# Patient Record
Sex: MALE | Race: WHITE | NOT HISPANIC OR LATINO | URBAN - METROPOLITAN AREA
[De-identification: names, ages, dates, MRNs, and addresses within clinical notes are randomized per-mention and may not be internally consistent; named-entity substitution may affect disease eponyms.]

---

## 2019-03-21 ENCOUNTER — EMERGENCY (EMERGENCY)
Facility: HOSPITAL | Age: 74
LOS: 1 days | Discharge: HOME | End: 2019-03-21
Attending: EMERGENCY MEDICINE
Payer: MEDICARE

## 2019-03-21 VITALS
DIASTOLIC BLOOD PRESSURE: 73 MMHG | SYSTOLIC BLOOD PRESSURE: 122 MMHG | OXYGEN SATURATION: 98 % | HEART RATE: 56 BPM | RESPIRATION RATE: 18 BRPM | TEMPERATURE: 98 F

## 2019-03-21 VITALS
OXYGEN SATURATION: 96 % | RESPIRATION RATE: 17 BRPM | TEMPERATURE: 96 F | WEIGHT: 179.9 LBS | DIASTOLIC BLOOD PRESSURE: 58 MMHG | SYSTOLIC BLOOD PRESSURE: 92 MMHG | HEIGHT: 69 IN | HEART RATE: 40 BPM

## 2019-03-21 LAB
ALBUMIN SERPL ELPH-MCNC: 3.5 G/DL — SIGNIFICANT CHANGE UP (ref 3.3–5.2)
ALP SERPL-CCNC: 63 U/L — SIGNIFICANT CHANGE UP (ref 40–120)
ALT FLD-CCNC: 12 U/L — SIGNIFICANT CHANGE UP
ANION GAP SERPL CALC-SCNC: 8 MMOL/L — SIGNIFICANT CHANGE UP (ref 5–17)
APTT BLD: 29.2 SEC — SIGNIFICANT CHANGE UP (ref 27.5–36.3)
AST SERPL-CCNC: 16 U/L — SIGNIFICANT CHANGE UP
BILIRUB SERPL-MCNC: 0.4 MG/DL — SIGNIFICANT CHANGE UP (ref 0.4–2)
BUN SERPL-MCNC: 19 MG/DL — SIGNIFICANT CHANGE UP (ref 8–20)
CALCIUM SERPL-MCNC: 9.7 MG/DL — SIGNIFICANT CHANGE UP (ref 8.6–10.2)
CHLORIDE SERPL-SCNC: 105 MMOL/L — SIGNIFICANT CHANGE UP (ref 98–107)
CO2 SERPL-SCNC: 28 MMOL/L — SIGNIFICANT CHANGE UP (ref 22–29)
CREAT SERPL-MCNC: 0.74 MG/DL — SIGNIFICANT CHANGE UP (ref 0.5–1.3)
GLUCOSE SERPL-MCNC: 97 MG/DL — SIGNIFICANT CHANGE UP (ref 70–115)
HCT VFR BLD CALC: 39.1 % — LOW (ref 42–52)
HGB BLD-MCNC: 13.3 G/DL — LOW (ref 14–18)
INR BLD: 1.12 RATIO — SIGNIFICANT CHANGE UP (ref 0.88–1.16)
MAGNESIUM SERPL-MCNC: 1.9 MG/DL — SIGNIFICANT CHANGE UP (ref 1.6–2.6)
MCHC RBC-ENTMCNC: 31.9 PG — HIGH (ref 27–31)
MCHC RBC-ENTMCNC: 34 G/DL — SIGNIFICANT CHANGE UP (ref 32–36)
MCV RBC AUTO: 93.8 FL — SIGNIFICANT CHANGE UP (ref 80–94)
NT-PROBNP SERPL-SCNC: 56 PG/ML — SIGNIFICANT CHANGE UP (ref 0–300)
PLATELET # BLD AUTO: 293 K/UL — SIGNIFICANT CHANGE UP (ref 150–400)
POTASSIUM SERPL-MCNC: 4 MMOL/L — SIGNIFICANT CHANGE UP (ref 3.5–5.3)
POTASSIUM SERPL-SCNC: 4 MMOL/L — SIGNIFICANT CHANGE UP (ref 3.5–5.3)
PROT SERPL-MCNC: 6 G/DL — LOW (ref 6.6–8.7)
PROTHROM AB SERPL-ACNC: 12.9 SEC — SIGNIFICANT CHANGE UP (ref 10–12.9)
RBC # BLD: 4.17 M/UL — LOW (ref 4.6–6.2)
RBC # FLD: 13 % — SIGNIFICANT CHANGE UP (ref 11–15.6)
SODIUM SERPL-SCNC: 141 MMOL/L — SIGNIFICANT CHANGE UP (ref 135–145)
TROPONIN T SERPL-MCNC: <0.01 NG/ML — SIGNIFICANT CHANGE UP (ref 0–0.06)
WBC # BLD: 6.8 K/UL — SIGNIFICANT CHANGE UP (ref 4.8–10.8)
WBC # FLD AUTO: 6.8 K/UL — SIGNIFICANT CHANGE UP (ref 4.8–10.8)

## 2019-03-21 PROCEDURE — 99284 EMERGENCY DEPT VISIT MOD MDM: CPT | Mod: 25

## 2019-03-21 PROCEDURE — 99284 EMERGENCY DEPT VISIT MOD MDM: CPT

## 2019-03-21 PROCEDURE — 70551 MRI BRAIN STEM W/O DYE: CPT

## 2019-03-21 PROCEDURE — 70551 MRI BRAIN STEM W/O DYE: CPT | Mod: 26

## 2019-03-21 PROCEDURE — 83735 ASSAY OF MAGNESIUM: CPT

## 2019-03-21 PROCEDURE — 80053 COMPREHEN METABOLIC PANEL: CPT

## 2019-03-21 PROCEDURE — 71045 X-RAY EXAM CHEST 1 VIEW: CPT | Mod: 26

## 2019-03-21 PROCEDURE — 96375 TX/PRO/DX INJ NEW DRUG ADDON: CPT

## 2019-03-21 PROCEDURE — 99285 EMERGENCY DEPT VISIT HI MDM: CPT

## 2019-03-21 PROCEDURE — 70450 CT HEAD/BRAIN W/O DYE: CPT

## 2019-03-21 PROCEDURE — 72125 CT NECK SPINE W/O DYE: CPT | Mod: 26

## 2019-03-21 PROCEDURE — 36415 COLL VENOUS BLD VENIPUNCTURE: CPT

## 2019-03-21 PROCEDURE — 85730 THROMBOPLASTIN TIME PARTIAL: CPT

## 2019-03-21 PROCEDURE — 83880 ASSAY OF NATRIURETIC PEPTIDE: CPT

## 2019-03-21 PROCEDURE — 70544 MR ANGIOGRAPHY HEAD W/O DYE: CPT

## 2019-03-21 PROCEDURE — 85610 PROTHROMBIN TIME: CPT

## 2019-03-21 PROCEDURE — 84484 ASSAY OF TROPONIN QUANT: CPT

## 2019-03-21 PROCEDURE — 71045 X-RAY EXAM CHEST 1 VIEW: CPT

## 2019-03-21 PROCEDURE — 96361 HYDRATE IV INFUSION ADD-ON: CPT

## 2019-03-21 PROCEDURE — 85027 COMPLETE CBC AUTOMATED: CPT

## 2019-03-21 PROCEDURE — 70450 CT HEAD/BRAIN W/O DYE: CPT | Mod: 26

## 2019-03-21 PROCEDURE — 72125 CT NECK SPINE W/O DYE: CPT

## 2019-03-21 PROCEDURE — 96374 THER/PROPH/DIAG INJ IV PUSH: CPT

## 2019-03-21 PROCEDURE — 70544 MR ANGIOGRAPHY HEAD W/O DYE: CPT | Mod: 26,59

## 2019-03-21 RX ORDER — SODIUM CHLORIDE 9 MG/ML
1000 INJECTION INTRAMUSCULAR; INTRAVENOUS; SUBCUTANEOUS ONCE
Qty: 0 | Refills: 0 | Status: COMPLETED | OUTPATIENT
Start: 2019-03-21 | End: 2019-03-21

## 2019-03-21 RX ORDER — DIPHENHYDRAMINE HCL 50 MG
50 CAPSULE ORAL ONCE
Qty: 0 | Refills: 0 | Status: COMPLETED | OUTPATIENT
Start: 2019-03-21 | End: 2019-03-21

## 2019-03-21 RX ADMIN — SODIUM CHLORIDE 1000 MILLILITER(S): 9 INJECTION INTRAMUSCULAR; INTRAVENOUS; SUBCUTANEOUS at 18:06

## 2019-03-21 RX ADMIN — Medication 1 MILLIGRAM(S): at 14:18

## 2019-03-21 RX ADMIN — Medication 50 MILLIGRAM(S): at 14:18

## 2019-03-21 RX ADMIN — Medication 1 MILLIGRAM(S): at 14:46

## 2019-03-21 RX ADMIN — SODIUM CHLORIDE 2000 MILLILITER(S): 9 INJECTION INTRAMUSCULAR; INTRAVENOUS; SUBCUTANEOUS at 15:14

## 2019-03-21 NOTE — ED PROVIDER NOTE - PHYSICAL EXAMINATION
left sided abd pain x3 days, vomiting last night and diarrhea since this morning, chills, diaphoresis and generalized weakness. Denies urianry symtpoms, sick contact at home. Mid abd discomfort secondary to throwing up. Last episode vomiting at 4:00am today. Hx of gallstones with similar symptoms

## 2019-03-21 NOTE — ED ADULT NURSE NOTE - EENT WDL
Eyes with no visual disturbances.  Ears clean and dry and no hearing difficulties. Nose with pink mucosa and no drainage.  Abrasions to nose present. Mouth mucous membranes moist and pink.  No tenderness or swelling to throat or neck.

## 2019-03-21 NOTE — ED ADULT TRIAGE NOTE - CHIEF COMPLAINT QUOTE
Patient arrived via EMS, awake, alert, baseline mental status as per EMS.  Patient from [facility].  breathing unlabored.  unsteady gait baseline.  As per patient, " leg fell asleep" and fell.  Dry blood noted to nose.  Upper lip swelling and bruising noted.  Patient has no complaints at this time. Patient arrived via EMS, awake, alert, baseline mental status as per EMS.  Patient from [facility].  breathing unlabored.  unsteady gait baseline.  As per patient, " leg fell asleep" and fell.  Dry blood noted to nose.  Upper lip swelling and bruising noted.  Patient has no complaints at this time.  patient denies CP, dizziness or lightheadedness

## 2019-03-21 NOTE — CONSULT NOTE ADULT - ASSESSMENT
A/P:  73yo male with PMH ?HTN, ?HLD, sent to ED from [facility] s/p fall.  Patient states "his legs gave out" and "medications make me feel drowsy" causing a fall, denies LOC, has abrasion to bridge of nose and swelling to nose noted.  Patient is poor historian, able to obtain some medical data from [facility], message left for PMD Missy ([phone]).  EKG no acte ischemic changes. Troponin neg x1. Patient asymptomatic.    1- Sinus bradycardia  - Tele, until continues above 50bpm  - Repeat EKG  - Ambulate to ensure HR remains above 50  - DC Metoprolol Tartrate  - Call out to Dr. Louis  - Outpatient cardio f/u within 1 week, Holter suggested    2- HLD  - c/w Simvastatin

## 2019-03-21 NOTE — ED ADULT NURSE NOTE - CHIEF COMPLAINT QUOTE
Patient arrived via EMS, awake, alert, baseline mental status as per EMS.  Patient from [facility].  breathing unlabored.  unsteady gait baseline.  As per patient, " leg fell asleep" and fell.  Dry blood noted to nose.  Upper lip swelling and bruising noted.  Patient has no complaints at this time.  patient denies CP, dizziness or lightheadedness

## 2019-03-21 NOTE — ED ADULT NURSE NOTE - OBJECTIVE STATEMENT
Assumed pt care at this time. Pt is alert, oriented to self only. Repetitively stating, "Get me out of here." Pt SB on monitor down to 40. Normotensive at this time. Pt with cough present. Dried blood to nose. Pt offers no complaints, except to leave. Call bell within reach, safety measures in place. Will continue to closely monitor. Assumed pt care at this time. Pt is alert, oriented to self only. Repetitively stating, "Get me out of here." Pt SB on monitor down to 40. Normotensive at this time. Pt with cough present. Dried blood to nose. Pt offers no complaints, except to leave. Call bell within reach, safety measures in place. Will continue to closely monitor. Pt placed on bearhugger for hypothermia. Assumed pt care at this time. Pt is alert, oriented to self only. Repetitively stating, "Get me out of here." Pt with slightly slurred speech, no facial droop. Pt SB on monitor down to 40. Normotensive at this time. Pt with cough present. Dried blood to nose. Pt offers no complaints, except to leave. Call bell within reach, safety measures in place. Will continue to closely monitor. Pt placed on bearhugger for hypothermia.

## 2019-03-21 NOTE — ED PROVIDER NOTE - CLINICAL SUMMARY MEDICAL DECISION MAKING FREE TEXT BOX
r/o traumatic injury, keep on monitor for braden, symptomatic braden and possible syncope, as somewhat limited historian will get cardiac consult

## 2019-03-21 NOTE — ED PROVIDER NOTE - OBJECTIVE STATEMENT
75 y/o M pt with hx of lives in group home presents to ED c/o stating his legs gave out and he fell, pt limited historian, there is visible trauma to the head with nasal abrasion and dry blood. HR noted to be 38 bpm. Denies any neck, chest or LE pain. Pt states he does not have a cardiologist.

## 2019-03-21 NOTE — ED PROVIDER NOTE - PROGRESS NOTE DETAILS
called  at group home - romy nurse was able to take message regarding CT/MRI findings and need for outpt cards f/u for bradycardia and neuro sx fx - reports will be sent with patient will arrange for transport back

## 2019-03-21 NOTE — ED ADULT NURSE REASSESSMENT NOTE - NS ED NURSE REASSESS COMMENT FT1
MRI tech called, pt is "wiggling around too much to get any good imaging" . Dr. Medina aware to be medicated with an additional 1mg IV ativan.

## 2019-03-21 NOTE — ED ADULT NURSE NOTE - NSIMPLEMENTINTERV_GEN_ALL_ED
Implemented All Fall with Harm Risk Interventions:  [place] to call system. Call bell, personal items and telephone within reach. Instruct patient to call for assistance. Room bathroom lighting operational. Non-slip footwear when patient is off stretcher. Physically safe environment: no spills, clutter or unnecessary equipment. Stretcher in lowest position, wheels locked, appropriate side rails in place. Provide visual cue, wrist band, yellow gown, etc. Monitor gait and stability. Monitor for mental status changes and reorient to person, place, and time. Review medications for side effects contributing to fall risk. Reinforce activity limits and safety measures with patient and family. Provide visual clues: red socks.

## 2019-03-21 NOTE — CONSULT NOTE ADULT - SUBJECTIVE AND OBJECTIVE BOX
CARDIOLOGY CONSULTATION NOTE   Consult requested by:  Dr. Medina    Reason for Consultation: Bradycardia    History obtained by: Patient, [facility] ([phone]) and medical record     obtained: No    Chief complaint:    Patient is a 74y old  Male who presents with a chief complaint of fall.    HPI:  73yo male with PMH ?HTN, ?HLD, sent to ED from [facility] s/p fall.  Patient states "his legs gave out" and "medications make me feel drowsy" causing a fall, denies LOC, has abrasion to bridge of nose and swelling to nose noted.  Patient is poor historian, able to obtain some medical data from [facility], message left for PMD Masod ([phone]). Denies chest pain/pressure, palpitations, irregular and/or rapid heart beat, SOB, TAVERAS, syncope/near syncope, dizziness, orthopnea, PND, cough, edema, n/v/d, hematuria, or hematochezia.      REVIEW OF SYMPTOMS:   Constitutional: Denied: fever, chills, weight loss or gain  Eyes: Denied: reddened ayes, eye discharge, eye pain  ENMT: Denied: nose swelling, pain and abrasion to bridge of nose s/p fall  Cardiovascular: Denied: chest pain,  Chest pressure, palpitation, arrhythmia, irregular or fast heart beats, edema  Respiratory: Denied: cough, phlegm production, wheezes, dyspnea, orthopnea, PND,   GI: Denied: Abdominal pain, nausea, vomiting, diarrhea, constipation, melena, rectal bleed  : Denied: hematuria, frequency  Musculoskeletal: Denied: Muscle aches, weakness, pain  Hematology/lymp: Denied: Bleeding disorder, anemia, blood clotting  Neuro: Denied: Headache, light headedness, dizziness, numbness, aphasia, dysarthria, seizure,  syncope, near syncope  Psych: Denied: depression, anxiety  Integumentary/skin: abrasion to bridge of nose s/p fall      ALL OTHER REVIEW OF SYSTEMS ARE NEGATIVE.    MEDICATIONS  (STANDING):  sodium chloride 0.9% Bolus 1000 milliLiter(s) IV Bolus once    Home Medications:  ASA 81mg QD  Famotidine 20mg QD  Folic Acid 1mg QD  Metoprolol Tartrate 25mg BID  Singulair 10mg qHS  Zyprexa 20mg qHS  Simvastatin 40mg qHS  Tamsulosin 0.4mg QD  Terazosin 2mg QD  Albuterol HFA PRN      PAST MEDICAL & SURGICAL HISTORY:  ?HTN  ?HLD      FAMILY HISTORY:  unknown    SOCIAL HISTORY: Lives at [facility]    CIGARETTES:  No    ALCOHOL: No    DRUGS: No    Vital Signs Last 24 Hrs  T(C): 36.6 (21 Mar 2019 10:19), Max: 36.6 (21 Mar 2019 10:19)  T(F): 97.9 (21 Mar 2019 10:19), Max: 97.9 (21 Mar 2019 10:19)  HR: 41 (21 Mar 2019 10:19) (38 - 41)  BP: 118/56 (21 Mar 2019 09:43) (92/58 - 118/56)  RR: 18 (21 Mar 2019 10:19) (16 - 18)  SpO2: 96% (21 Mar 2019 10:19) (96% - 96%)    PHYSICAL EXAM:      Constitutional: Disheveled. No fever, chills, NAD, Comfortable    Eyes: Not reddened, no discharge    ENMT: No discharge, No pain    Neck: No JVD, No Bruit    Back: No CVA tenderness    Respiratory: Clear to auscultation bilaterally    Cardiovascular: Sinus Bradycardia. RRR, Normal S1-2, No S3-, No friction rub murmur    Gastrointestinal: Soft, NT/ND. BS+, No organomegaly    Extremities: No edema    Vascular: Distal pulses intact    Neurological: Alert, awake, oriented to self, able to move extremities, speech is clear    Skin: Abrasion to bridge of nose    Musculoskeletal: Non tender, no weaknss    Psychiatric: anxious, restless        INTERPRETATION OF TELEMETRY: Sinus bradycardia 40-50s, no arrhythmic events noted     ECG: Sinus bradycardia, 38bpm, 1st degree AV block, NSST abnormalities      LABS:                        13.3   6.8   )-----------( 293      ( 21 Mar 2019 09:04 )             39.1     03-21    141  |  105  |  19.0  ----------------------------<  97  4.0   |  28.0  |  0.74    Ca    9.7      21 Mar 2019 09:04  Mg     1.9     03-21    TPro  6.0<L>  /  Alb  3.5  /  TBili  0.4  /  DBili  x   /  AST  16  /  ALT  12  /  AlkPhos  63  03-21    CARDIAC MARKERS ( 21 Mar 2019 09:04 )  x     / <0.01 ng/mL / x     / x     / x          PT/INR - ( 21 Mar 2019 09:04 )   PT: 12.9 sec;   INR: 1.12 ratio         PTT - ( 21 Mar 2019 09:04 )  PTT:29.2 sec    I&O's Summary      RADIOLOGY & ADDITIONAL STUDIES:   X-ray:    < from: Xray Chest 1 View-PORTABLE IMMEDIATE (03.21.19 @ 09:22) >  EXAM:  XR CHEST PORTABLE IMMED 1V                          PROCEDURE DATE:  03/21/2019          INTERPRETATION:  Clinical information: Chest pain.    Technique: Frontal view of the chest.    Comparison: None available.    Findings: The lungs are clear. The heart size is enlarged. Multilevel   degenerative changes are noted within the imaged potions of the spine.    IMPRESSION: Cardiomegaly.    < end of copied text >    < from: CT Head No Cont (03.21.19 @ 08:49) >  IMPRESSION:  Brain:    No CT evidence of an acute territorial infarction, hemorrhage or acute   hydrocephalus.     Dense extra-axial lesion along the planum sphenoidale eccentric to the   left, likely meningioma, other etiologies including aneurysm cannot be   excluded, follow-up contrast enhanced MRI/MRA suggested.    Prominence of ventricles consistent with involutional changes.    Chronic small vessel ischemic changes.    Cervical spine:    Chronic multilevel degenerative cervical spondylosis without acute   fracture; multilevel canal and foraminal narrowing as described above.      If symptoms persist an additional evaluation is needed, follow-up MRI is   suggested.    < end of copied text >

## 2019-03-25 PROBLEM — Z00.00 ENCOUNTER FOR PREVENTIVE HEALTH EXAMINATION: Status: ACTIVE | Noted: 2019-03-25

## 2019-03-26 ENCOUNTER — APPOINTMENT (OUTPATIENT)
Dept: NEUROSURGERY | Facility: CLINIC | Age: 74
End: 2019-03-26
Payer: MEDICARE

## 2019-03-26 VITALS
WEIGHT: 180 LBS | TEMPERATURE: 98.2 F | BODY MASS INDEX: 28.25 KG/M2 | HEART RATE: 50 BPM | SYSTOLIC BLOOD PRESSURE: 116 MMHG | DIASTOLIC BLOOD PRESSURE: 69 MMHG | HEIGHT: 67 IN

## 2019-03-26 DIAGNOSIS — Z78.9 OTHER SPECIFIED HEALTH STATUS: ICD-10-CM

## 2019-03-26 DIAGNOSIS — Z86.79 PERSONAL HISTORY OF OTHER DISEASES OF THE CIRCULATORY SYSTEM: ICD-10-CM

## 2019-03-26 DIAGNOSIS — Z86.39 PERSONAL HISTORY OF OTHER ENDOCRINE, NUTRITIONAL AND METABOLIC DISEASE: ICD-10-CM

## 2019-03-26 PROCEDURE — 99203 OFFICE O/P NEW LOW 30 MIN: CPT

## 2019-04-01 NOTE — HISTORY OF PRESENT ILLNESS
[FreeTextEntry1] : s/p fall incidental finding planum meningioma.  [de-identified] : Mr. Leon is a 74 year old male who presents for initial neurosurgical evaluation of an incidental finding of planum meningioma.s/p fall.   Patient resides in a group home setting.  He recalls sustaining a fall after his "leg fell asleep".  Patient struck left side of head and transported to [facility] ER  3/21/2019.   At today's visit, he admits to intermittent left side of head pain secondary to head impact.  Denies any nausea or vomiting.  Tolerating po.  Denies any visual disturbances or seizures.  Denies any numbness/tingling or weakness of extremities.  No new falls.   Denies any personal or family history of cancer.  Denies any fever or chills.

## 2019-04-01 NOTE — REASON FOR VISIT
[New Patient Visit] : a new patient visit [Referred By: _________] : Patient was referred by [facility]IC [FreeTextEntry1] : planum meningioma

## 2019-04-01 NOTE — PHYSICAL EXAM
[General Appearance - Alert] : alert [General Appearance - In No Acute Distress] : in no acute distress [General Appearance - Well Nourished] : well nourished [General Appearance - Well Developed] : well developed [General Appearance - Well-Appearing] : healthy appearing [] : normal voice and communication [Oriented to Time] : oriented to time [Person] : oriented to person [Fluency] : fluency intact [Cranial Nerves Optic (II)] : visual acuity intact bilaterally,  pupils equal round and reactive to light [Cranial Nerves Oculomotor (III)] : extraocular motion intact [Cranial Nerves Trigeminal (V)] : facial sensation intact symmetrically [Cranial Nerves Facial (VII)] : face symmetrical [Cranial Nerves Glossopharyngeal (IX)] : tongue and palate midline [Cranial Nerves Accessory (XI - Cranial And Spinal)] : head turning and shoulder shrug symmetric [Cranial Nerves Hypoglossal (XII)] : there was no tongue deviation with protrusion [Motor Tone] : muscle tone was normal in all four extremities [Motor Strength] : muscle strength was normal in all four extremities [Sensation Tactile Decrease] : light touch was intact [Sensation Pain / Temperature Decrease] : pain and temperature was intact [Abnormal Walk] : normal gait [Balance] : balance was intact [No Visual Abnormalities] : no visible abnormailities [Normal] : normal [Oriented to Place] : disoriented to place [Over the Past 2 Weeks, Have You Felt Down, Depressed, or Hopeless?] : 1.) Over the past 2 weeks, have you felt down, depressed, or hopeless? No [Over the Past 2 Weeks, Have You Felt Little Interest or Pleasure Doing Things?] : 2.) Over the past 2 weeks, have you felt little interest or pleasure doing things? No

## 2019-04-01 NOTE — ASSESSMENT
[FreeTextEntry1] : Mr. Leon presents with above history and imaging.  There is an incidental finding of a planum meningioma after patient sustained a fall at his group home setting.  He will obtain an MRI brain w/wo contrast to further assess his meningioma.  He should follow up after imaging.  He knows to call the office if there are any new or worsening symptoms.

## 2019-05-17 PROBLEM — Z78.9 OTHER SPECIFIED HEALTH STATUS: Chronic | Status: ACTIVE | Noted: 2019-03-21

## 2019-05-28 ENCOUNTER — APPOINTMENT (OUTPATIENT)
Dept: NEUROSURGERY | Facility: CLINIC | Age: 74
End: 2019-05-28
Payer: MEDICARE

## 2019-05-28 VITALS
SYSTOLIC BLOOD PRESSURE: 128 MMHG | WEIGHT: 180 LBS | DIASTOLIC BLOOD PRESSURE: 70 MMHG | HEIGHT: 67 IN | TEMPERATURE: 98.8 F | HEART RATE: 60 BPM | BODY MASS INDEX: 28.25 KG/M2

## 2019-05-28 DIAGNOSIS — D32.9 BENIGN NEOPLASM OF MENINGES, UNSPECIFIED: ICD-10-CM

## 2019-05-28 PROCEDURE — 99214 OFFICE O/P EST MOD 30 MIN: CPT

## 2019-05-28 RX ORDER — SIMVASTATIN 40 MG/1
40 TABLET, FILM COATED ORAL
Refills: 0 | Status: ACTIVE | COMMUNITY

## 2019-05-28 RX ORDER — OLANZAPINE 20 MG/1
20 TABLET, FILM COATED ORAL
Refills: 0 | Status: ACTIVE | COMMUNITY

## 2019-05-28 RX ORDER — ASPIRIN ENTERIC COATED TABLETS 81 MG 81 MG/1
81 TABLET, DELAYED RELEASE ORAL
Refills: 0 | Status: ACTIVE | COMMUNITY

## 2019-05-28 RX ORDER — TAMSULOSIN HYDROCHLORIDE 0.4 MG/1
0.4 CAPSULE ORAL
Refills: 0 | Status: ACTIVE | COMMUNITY

## 2019-05-28 RX ORDER — FAMOTIDINE 20 MG/1
20 TABLET, FILM COATED ORAL
Refills: 0 | Status: ACTIVE | COMMUNITY

## 2019-05-28 RX ORDER — FOLIC ACID 1 MG/1
1 TABLET ORAL
Refills: 0 | Status: ACTIVE | COMMUNITY

## 2019-05-28 RX ORDER — MONTELUKAST 10 MG/1
10 TABLET, FILM COATED ORAL
Refills: 0 | Status: ACTIVE | COMMUNITY

## 2019-05-28 RX ORDER — METOPROLOL SUCCINATE 25 MG/1
25 TABLET, EXTENDED RELEASE ORAL
Refills: 0 | Status: ACTIVE | COMMUNITY

## 2019-05-28 RX ORDER — TERAZOSIN 2 MG/1
2 CAPSULE ORAL
Refills: 0 | Status: ACTIVE | COMMUNITY

## 2019-05-28 RX ORDER — ALBUTEROL SULFATE 1.25 MG/3ML
SOLUTION RESPIRATORY (INHALATION)
Refills: 0 | Status: ACTIVE | COMMUNITY

## 2019-05-28 NOTE — PHYSICAL EXAM
[General Appearance - Alert] : alert [General Appearance - In No Acute Distress] : in no acute distress [General Appearance - Well Nourished] : well nourished [General Appearance - Well Developed] : well developed [General Appearance - Well-Appearing] : healthy appearing [] : normal voice and communication [Oriented To Time, Place, And Person] : oriented to person, place, and time [Oriented to Person] : oriented to person [Oriented to Place] : oriented to place [Person] : oriented to person [Place] : oriented to place [Fluency] : fluency intact [Cranial Nerves Optic (II)] : visual acuity intact bilaterally,  pupils equal round and reactive to light [Cranial Nerves Oculomotor (III)] : extraocular motion intact [Cranial Nerves Trigeminal (V)] : facial sensation intact symmetrically [Cranial Nerves Facial (VII)] : face symmetrical [Cranial Nerves Glossopharyngeal (IX)] : tongue and palate midline [Cranial Nerves Accessory (XI - Cranial And Spinal)] : head turning and shoulder shrug symmetric [Cranial Nerves Hypoglossal (XII)] : there was no tongue deviation with protrusion [Motor Tone] : muscle tone was normal in all four extremities [Motor Strength] : muscle strength was normal in all four extremities [Sensation Tactile Decrease] : light touch was intact [Sensation Pain / Temperature Decrease] : pain and temperature was intact [Abnormal Walk] : normal gait [Balance] : balance was intact [No Visual Abnormalities] : no visible abnormailities [Normal] : normal

## 2019-05-28 NOTE — ASSESSMENT
[FreeTextEntry1] : Mr. Leon presents with above history and imaging.  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  Patient will obtain a Opthalmology exam with VF testing for baseline.  Patient will repeat his MRI brain w/wo contrast to assess for any progression of his planum meningioma. This was discussed with Priscilla his formal caregiver and Isidro HOUSTON at the [facility].  Patient and staff know to call the office if there are any new or worsening symptoms.

## 2021-05-03 ENCOUNTER — APPOINTMENT (OUTPATIENT)
Dept: SURGERY | Facility: CLINIC | Age: 76
End: 2021-05-03
Payer: MEDICARE

## 2021-05-03 VITALS
DIASTOLIC BLOOD PRESSURE: 66 MMHG | BODY MASS INDEX: 25.43 KG/M2 | WEIGHT: 162 LBS | HEART RATE: 52 BPM | OXYGEN SATURATION: 99 % | TEMPERATURE: 97.4 F | RESPIRATION RATE: 16 BRPM | HEIGHT: 67 IN | SYSTOLIC BLOOD PRESSURE: 122 MMHG

## 2021-05-03 DIAGNOSIS — K21.9 GASTRO-ESOPHAGEAL REFLUX DISEASE W/OUT ESOPHAGITIS: ICD-10-CM

## 2021-05-03 DIAGNOSIS — Z01.818 ENCOUNTER FOR OTHER PREPROCEDURAL EXAMINATION: ICD-10-CM

## 2021-05-03 DIAGNOSIS — N40.0 BENIGN PROSTATIC HYPERPLASIA WITHOUT LOWER URINARY TRACT SYMPMS: ICD-10-CM

## 2021-05-03 DIAGNOSIS — R19.09 OTHER INTRA-ABDOMINAL AND PELVIC SWELLING, MASS AND LUMP: ICD-10-CM

## 2021-05-03 DIAGNOSIS — I10 ESSENTIAL (PRIMARY) HYPERTENSION: ICD-10-CM

## 2021-05-03 DIAGNOSIS — J44.9 CHRONIC OBSTRUCTIVE PULMONARY DISEASE, UNSPECIFIED: ICD-10-CM

## 2021-05-03 DIAGNOSIS — F20.9 SCHIZOPHRENIA, UNSPECIFIED: ICD-10-CM

## 2021-05-03 DIAGNOSIS — E78.5 HYPERLIPIDEMIA, UNSPECIFIED: ICD-10-CM

## 2021-05-03 PROCEDURE — 99202 OFFICE O/P NEW SF 15 MIN: CPT

## 2021-05-03 PROCEDURE — 99212 OFFICE O/P EST SF 10 MIN: CPT

## 2021-05-03 NOTE — PHYSICAL EXAM
[Normal Thyroid] : the thyroid was normal [Normal Breath Sounds] : Normal breath sounds [Normal Heart Sounds] : normal heart sounds [Normal Rate and Rhythm] : normal rate and rhythm [No Rash or Lesion] : No rash or lesion [Alert] : alert [Oriented to Person] : oriented to person [Oriented to Place] : oriented to place [Agitated] : agitated [JVD] : no jugular venous distention  [Abdominal Masses] : No abdominal masses [Abdomen Tenderness] : ~T ~M No abdominal tenderness [Tender] : was nontender [Purpura] : no purpura  [Enlarged] : not enlarged [Petechiae] : no petechiae [Skin Ulcer] : no ulcer [Skin Induration] : no induration [Oriented to Time] : disoriented to time [de-identified] : NAD  [de-identified] : LAY [de-identified] : soft non tender nondistended  [de-identified] : + bulge to right groin [de-identified] : grossly intact  [de-identified] : clean dry intact

## 2021-05-03 NOTE — ASSESSMENT
[FreeTextEntry1] : 76 year old man with extensive psychiatric and medical history (obtained by [facility] Manager, Jessica). Possible right inguinal hernia; patient/caregiver given Rx for CT abd/pelvis as pt refusing full PE at this time.\par

## 2021-05-03 NOTE — REVIEW OF SYSTEMS
[Confused] : confusion [Negative] : Heme/Lymph [Dysuria] : no dysuria [Incontinence] : no incontinence [Hesitancy] : no urinary hesitancy [Nocturia] : no nocturia [Genital Lesion] : no genital lesions [Testicular Pain] : no testicular pain [Convulsions] : no convulsions [Dizziness] : no dizziness [Fainting] : no fainting [Limb Weakness] : no limb weakness [Difficulty Walking] : no difficulty walking [FreeTextEntry8] : + bulge to right pubic area [de-identified] : agitated

## 2021-05-03 NOTE — HISTORY OF PRESENT ILLNESS
[de-identified] : Mr. VERONICA PORTILLO is a 76 year-old man who resides in [facility]. History of schizophrenia, GERD, HTN, HLD, COPD and bulge to right groin noticed by home health aide x 5 days ago. No additional paperwork with patient or caregiver. Awaiting callback from manager at [facility]

## 2021-05-03 NOTE — PLAN
[FreeTextEntry1] : RTC after CT obtained, Adult Home (Jessica) made aware that patients HCP should be actively involved in patient's care if surgery is recommended. Plan discussed with above, verbalized understanding.

## 2022-03-27 NOTE — DATA REVIEWED
[de-identified] : Left planum sphenoidale meningioma.  The mass appears to extend into the adjacent left optic canal and partially encases the left optic nerve proximal to the chiasm.  The chiasm is clear.  1 Principal Discharge DX:	Pain, dental

## 2022-06-22 ENCOUNTER — INPATIENT (INPATIENT)
Facility: HOSPITAL | Age: 77
LOS: 1 days | Discharge: HOME | DRG: 158 | End: 2022-06-24
Attending: INTERNAL MEDICINE | Admitting: FAMILY MEDICINE
Payer: MEDICARE

## 2022-06-22 VITALS
SYSTOLIC BLOOD PRESSURE: 115 MMHG | HEIGHT: 69 IN | DIASTOLIC BLOOD PRESSURE: 68 MMHG | WEIGHT: 199.96 LBS | RESPIRATION RATE: 16 BRPM | TEMPERATURE: 98 F | HEART RATE: 98 BPM | OXYGEN SATURATION: 95 %

## 2022-06-22 DIAGNOSIS — L03.211 CELLULITIS OF FACE: ICD-10-CM

## 2022-06-22 LAB
ALBUMIN SERPL ELPH-MCNC: 3.9 G/DL — SIGNIFICANT CHANGE UP (ref 3.3–5.2)
ALP SERPL-CCNC: 63 U/L — SIGNIFICANT CHANGE UP (ref 40–120)
ALT FLD-CCNC: 10 U/L — SIGNIFICANT CHANGE UP
ANION GAP SERPL CALC-SCNC: 10 MMOL/L — SIGNIFICANT CHANGE UP (ref 5–17)
APPEARANCE UR: ABNORMAL
AST SERPL-CCNC: 16 U/L — SIGNIFICANT CHANGE UP
BACTERIA # UR AUTO: ABNORMAL
BASOPHILS # BLD AUTO: 0.03 K/UL — SIGNIFICANT CHANGE UP (ref 0–0.2)
BASOPHILS NFR BLD AUTO: 0.2 % — SIGNIFICANT CHANGE UP (ref 0–2)
BILIRUB SERPL-MCNC: 0.8 MG/DL — SIGNIFICANT CHANGE UP (ref 0.4–2)
BILIRUB UR-MCNC: NEGATIVE — SIGNIFICANT CHANGE UP
BUN SERPL-MCNC: 19.3 MG/DL — SIGNIFICANT CHANGE UP (ref 8–20)
CALCIUM SERPL-MCNC: 9.4 MG/DL — SIGNIFICANT CHANGE UP (ref 8.6–10.2)
CHLORIDE SERPL-SCNC: 100 MMOL/L — SIGNIFICANT CHANGE UP (ref 98–107)
CK SERPL-CCNC: 102 U/L — SIGNIFICANT CHANGE UP (ref 30–200)
CO2 SERPL-SCNC: 25 MMOL/L — SIGNIFICANT CHANGE UP (ref 22–29)
COLOR SPEC: YELLOW — SIGNIFICANT CHANGE UP
CREAT SERPL-MCNC: 1.04 MG/DL — SIGNIFICANT CHANGE UP (ref 0.5–1.3)
CRP SERPL-MCNC: 140 MG/L — HIGH
DIFF PNL FLD: ABNORMAL
EGFR: 75 ML/MIN/1.73M2 — SIGNIFICANT CHANGE UP
EOSINOPHIL # BLD AUTO: 0 K/UL — SIGNIFICANT CHANGE UP (ref 0–0.5)
EOSINOPHIL NFR BLD AUTO: 0 % — SIGNIFICANT CHANGE UP (ref 0–6)
EPI CELLS # UR: SIGNIFICANT CHANGE UP
ERYTHROCYTE [SEDIMENTATION RATE] IN BLOOD: 30 MM/HR — HIGH (ref 0–20)
GLUCOSE SERPL-MCNC: 107 MG/DL — HIGH (ref 70–99)
GLUCOSE UR QL: 250 MG/DL
HCT VFR BLD CALC: 41.6 % — SIGNIFICANT CHANGE UP (ref 39–50)
HGB BLD-MCNC: 14.5 G/DL — SIGNIFICANT CHANGE UP (ref 13–17)
HYALINE CASTS # UR AUTO: ABNORMAL /LPF
IMM GRANULOCYTES NFR BLD AUTO: 0.4 % — SIGNIFICANT CHANGE UP (ref 0–1.5)
KETONES UR-MCNC: NEGATIVE — SIGNIFICANT CHANGE UP
LACTATE BLDV-MCNC: 1.5 MMOL/L — SIGNIFICANT CHANGE UP (ref 0.5–2)
LEUKOCYTE ESTERASE UR-ACNC: ABNORMAL
LYMPHOCYTES # BLD AUTO: 0.81 K/UL — LOW (ref 1–3.3)
LYMPHOCYTES # BLD AUTO: 5.6 % — LOW (ref 13–44)
MCHC RBC-ENTMCNC: 32.4 PG — SIGNIFICANT CHANGE UP (ref 27–34)
MCHC RBC-ENTMCNC: 34.9 GM/DL — SIGNIFICANT CHANGE UP (ref 32–36)
MCV RBC AUTO: 92.9 FL — SIGNIFICANT CHANGE UP (ref 80–100)
MONOCYTES # BLD AUTO: 1.27 K/UL — HIGH (ref 0–0.9)
MONOCYTES NFR BLD AUTO: 8.7 % — SIGNIFICANT CHANGE UP (ref 2–14)
NEUTROPHILS # BLD AUTO: 12.36 K/UL — HIGH (ref 1.8–7.4)
NEUTROPHILS NFR BLD AUTO: 85.1 % — HIGH (ref 43–77)
NITRITE UR-MCNC: NEGATIVE — SIGNIFICANT CHANGE UP
PH UR: 6 — SIGNIFICANT CHANGE UP (ref 5–8)
PLATELET # BLD AUTO: 186 K/UL — SIGNIFICANT CHANGE UP (ref 150–400)
POTASSIUM SERPL-MCNC: 4.1 MMOL/L — SIGNIFICANT CHANGE UP (ref 3.5–5.3)
POTASSIUM SERPL-SCNC: 4.1 MMOL/L — SIGNIFICANT CHANGE UP (ref 3.5–5.3)
PROT SERPL-MCNC: 6.7 G/DL — SIGNIFICANT CHANGE UP (ref 6.6–8.7)
PROT UR-MCNC: 30 MG/DL
RAPID RVP RESULT: SIGNIFICANT CHANGE UP
RBC # BLD: 4.48 M/UL — SIGNIFICANT CHANGE UP (ref 4.2–5.8)
RBC # FLD: 12.6 % — SIGNIFICANT CHANGE UP (ref 10.3–14.5)
RBC CASTS # UR COMP ASSIST: ABNORMAL /HPF (ref 0–4)
SARS-COV-2 RNA SPEC QL NAA+PROBE: SIGNIFICANT CHANGE UP
SODIUM SERPL-SCNC: 134 MMOL/L — LOW (ref 135–145)
SP GR SPEC: 1.01 — SIGNIFICANT CHANGE UP (ref 1.01–1.02)
TROPONIN T SERPL-MCNC: <0.01 NG/ML — SIGNIFICANT CHANGE UP (ref 0–0.06)
UROBILINOGEN FLD QL: NEGATIVE MG/DL — SIGNIFICANT CHANGE UP
WBC # BLD: 14.53 K/UL — HIGH (ref 3.8–10.5)
WBC # FLD AUTO: 14.53 K/UL — HIGH (ref 3.8–10.5)
WBC UR QL: ABNORMAL /HPF (ref 0–5)

## 2022-06-22 PROCEDURE — 99223 1ST HOSP IP/OBS HIGH 75: CPT

## 2022-06-22 PROCEDURE — 70487 CT MAXILLOFACIAL W/DYE: CPT | Mod: 26,MA

## 2022-06-22 PROCEDURE — 72125 CT NECK SPINE W/O DYE: CPT | Mod: 26,MA

## 2022-06-22 PROCEDURE — 70450 CT HEAD/BRAIN W/O DYE: CPT | Mod: 26,MA

## 2022-06-22 PROCEDURE — 93010 ELECTROCARDIOGRAM REPORT: CPT

## 2022-06-22 PROCEDURE — 99285 EMERGENCY DEPT VISIT HI MDM: CPT

## 2022-06-22 PROCEDURE — 71045 X-RAY EXAM CHEST 1 VIEW: CPT | Mod: 26

## 2022-06-22 PROCEDURE — 72170 X-RAY EXAM OF PELVIS: CPT | Mod: 26

## 2022-06-22 RX ORDER — OLANZAPINE 15 MG/1
1 TABLET, FILM COATED ORAL
Qty: 0 | Refills: 0 | DISCHARGE

## 2022-06-22 RX ORDER — MONTELUKAST 4 MG/1
10 TABLET, CHEWABLE ORAL DAILY
Refills: 0 | Status: DISCONTINUED | OUTPATIENT
Start: 2022-06-22 | End: 2022-06-24

## 2022-06-22 RX ORDER — AMPICILLIN SODIUM AND SULBACTAM SODIUM 250; 125 MG/ML; MG/ML
INJECTION, POWDER, FOR SUSPENSION INTRAMUSCULAR; INTRAVENOUS
Refills: 0 | Status: DISCONTINUED | OUTPATIENT
Start: 2022-06-22 | End: 2022-06-23

## 2022-06-22 RX ORDER — FAMOTIDINE 10 MG/ML
20 INJECTION INTRAVENOUS
Refills: 0 | Status: DISCONTINUED | OUTPATIENT
Start: 2022-06-22 | End: 2022-06-24

## 2022-06-22 RX ORDER — TAMSULOSIN HYDROCHLORIDE 0.4 MG/1
0.4 CAPSULE ORAL AT BEDTIME
Refills: 0 | Status: DISCONTINUED | OUTPATIENT
Start: 2022-06-22 | End: 2022-06-24

## 2022-06-22 RX ORDER — ONDANSETRON 8 MG/1
4 TABLET, FILM COATED ORAL EVERY 8 HOURS
Refills: 0 | Status: DISCONTINUED | OUTPATIENT
Start: 2022-06-22 | End: 2022-06-24

## 2022-06-22 RX ORDER — OLANZAPINE 15 MG/1
20 TABLET, FILM COATED ORAL DAILY
Refills: 0 | Status: DISCONTINUED | OUTPATIENT
Start: 2022-06-22 | End: 2022-06-24

## 2022-06-22 RX ORDER — TAMSULOSIN HYDROCHLORIDE 0.4 MG/1
1 CAPSULE ORAL
Qty: 0 | Refills: 0 | DISCHARGE

## 2022-06-22 RX ORDER — SODIUM CHLORIDE 9 MG/ML
1000 INJECTION INTRAMUSCULAR; INTRAVENOUS; SUBCUTANEOUS
Refills: 0 | Status: DISCONTINUED | OUTPATIENT
Start: 2022-06-22 | End: 2022-06-24

## 2022-06-22 RX ORDER — FAMOTIDINE 10 MG/ML
1 INJECTION INTRAVENOUS
Qty: 0 | Refills: 0 | DISCHARGE

## 2022-06-22 RX ORDER — AMPICILLIN SODIUM AND SULBACTAM SODIUM 250; 125 MG/ML; MG/ML
1.5 INJECTION, POWDER, FOR SUSPENSION INTRAMUSCULAR; INTRAVENOUS EVERY 6 HOURS
Refills: 0 | Status: DISCONTINUED | OUTPATIENT
Start: 2022-06-22 | End: 2022-06-23

## 2022-06-22 RX ORDER — SIMVASTATIN 20 MG/1
1 TABLET, FILM COATED ORAL
Qty: 0 | Refills: 0 | DISCHARGE

## 2022-06-22 RX ORDER — TERAZOSIN HYDROCHLORIDE 10 MG/1
1 CAPSULE ORAL
Qty: 0 | Refills: 0 | DISCHARGE

## 2022-06-22 RX ORDER — LANOLIN ALCOHOL/MO/W.PET/CERES
3 CREAM (GRAM) TOPICAL AT BEDTIME
Refills: 0 | Status: DISCONTINUED | OUTPATIENT
Start: 2022-06-22 | End: 2022-06-24

## 2022-06-22 RX ORDER — SODIUM CHLORIDE 9 MG/ML
1000 INJECTION INTRAMUSCULAR; INTRAVENOUS; SUBCUTANEOUS ONCE
Refills: 0 | Status: COMPLETED | OUTPATIENT
Start: 2022-06-22 | End: 2022-06-22

## 2022-06-22 RX ORDER — ACETAMINOPHEN 500 MG
650 TABLET ORAL EVERY 6 HOURS
Refills: 0 | Status: DISCONTINUED | OUTPATIENT
Start: 2022-06-22 | End: 2022-06-24

## 2022-06-22 RX ORDER — SIMVASTATIN 20 MG/1
20 TABLET, FILM COATED ORAL AT BEDTIME
Refills: 0 | Status: DISCONTINUED | OUTPATIENT
Start: 2022-06-22 | End: 2022-06-24

## 2022-06-22 RX ORDER — MONTELUKAST 4 MG/1
1 TABLET, CHEWABLE ORAL
Qty: 0 | Refills: 0 | DISCHARGE

## 2022-06-22 RX ORDER — AMPICILLIN SODIUM AND SULBACTAM SODIUM 250; 125 MG/ML; MG/ML
1.5 INJECTION, POWDER, FOR SUSPENSION INTRAMUSCULAR; INTRAVENOUS ONCE
Refills: 0 | Status: COMPLETED | OUTPATIENT
Start: 2022-06-22 | End: 2022-06-22

## 2022-06-22 RX ORDER — ENOXAPARIN SODIUM 100 MG/ML
40 INJECTION SUBCUTANEOUS EVERY 24 HOURS
Refills: 0 | Status: DISCONTINUED | OUTPATIENT
Start: 2022-06-22 | End: 2022-06-24

## 2022-06-22 RX ADMIN — AMPICILLIN SODIUM AND SULBACTAM SODIUM 100 GRAM(S): 250; 125 INJECTION, POWDER, FOR SUSPENSION INTRAMUSCULAR; INTRAVENOUS at 18:16

## 2022-06-22 RX ADMIN — SODIUM CHLORIDE 65 MILLILITER(S): 9 INJECTION INTRAMUSCULAR; INTRAVENOUS; SUBCUTANEOUS at 18:16

## 2022-06-22 RX ADMIN — Medication 100 MILLIGRAM(S): at 12:04

## 2022-06-22 RX ADMIN — FAMOTIDINE 20 MILLIGRAM(S): 10 INJECTION INTRAVENOUS at 18:16

## 2022-06-22 RX ADMIN — TAMSULOSIN HYDROCHLORIDE 0.4 MILLIGRAM(S): 0.4 CAPSULE ORAL at 21:29

## 2022-06-22 RX ADMIN — SIMVASTATIN 20 MILLIGRAM(S): 20 TABLET, FILM COATED ORAL at 21:29

## 2022-06-22 RX ADMIN — OLANZAPINE 20 MILLIGRAM(S): 15 TABLET, FILM COATED ORAL at 21:29

## 2022-06-22 RX ADMIN — SODIUM CHLORIDE 1000 MILLILITER(S): 9 INJECTION INTRAMUSCULAR; INTRAVENOUS; SUBCUTANEOUS at 09:21

## 2022-06-22 RX ADMIN — AMPICILLIN SODIUM AND SULBACTAM SODIUM 100 GRAM(S): 250; 125 INJECTION, POWDER, FOR SUSPENSION INTRAMUSCULAR; INTRAVENOUS at 13:42

## 2022-06-22 NOTE — H&P ADULT - NSHPPHYSICALEXAM_GEN_ALL_CORE
T(C): 36.7 (06-22-22 @ 10:29), Max: 36.9 (06-22-22 @ 07:59)  HR: 89 (06-22-22 @ 10:29) (89 - 98)  BP: 156/85 (06-22-22 @ 10:29) (115/68 - 156/85)  RR: 18 (06-22-22 @ 10:29) (16 - 18)  SpO2: 97% (06-22-22 @ 10:29) (95% - 97%)    GEN - NAD  HEENT - NCAT, EOMI, JUANCHO, swollen rt lower face/rt side of neck, also enlarge c-ln b/l. non tender. no active gum swelling/lesion seen but few broken teeth- lower  RESP - CTA BL, no wheeze/rhonchi/crackles. not on supplemental O2.  CARDIO - NS1S2, RRR. No murmurs/rubs/gallops.  ABD - Soft/Non tender/Non distended. Normal BS x4 quadrants.   Ext - No PARISA.   MSK - full ROM of BL upper and lower extremities without pain or restriction. BL 5/5 strength on upper and lower extremities. rt knee-skin abrasion, non tender  Neuro - cn 2-12 grossly intact.  no visible seizure activity appreciated. no tremor. gait not observed.    Psych- AAOx3. coop

## 2022-06-22 NOTE — ED PROVIDER NOTE - OBJECTIVE STATEMENT
78 y/o M with h/o MR from group home sent fro recurrent fall today and has rt facial swelling. Pt states that he had diarrhea for 1 month and feels weak and could not go to the program. 78 y/o M with h/o MR from group home sent fro recurrent fall today and has rt facial swelling. Pt states that he had diarrhea for 1 month and feels weak and could not go to the program. Pt is unsure if  he hit his head 76 y/o M with h/o MR from group home sent fro recurrent fall today and has rt facial swelling. Pt states that he had diarrhea for 1 month and feels weak and could not go to the program. Pt is unsure if  he hit his head. Pt has been falling a lot today and he was sent for eval

## 2022-06-22 NOTE — H&P ADULT - HISTORY OF PRESENT ILLNESS
78 y/o M with h/o MR from group home sent fro recurrent fall today and has rt facial swelling. Pt states that he had diarrhea for 1 month and feels weak and could not go to the program. pt states he had the shakes for a few days and that caused him to lose his balance. He has loose stool for while but not present now. pt also c/o frontal tooth pain for a few days and welling noted to right lower jaw and swelling noted to his lower rt jaw. pt denies LOC or blood thinner use. pt has abrasion noted to his right knee. Pt states he fell on his knees. Pt is started Amoxicillin yesterday per [facility]. Pt denies fever,chill,cp,sob,headache ,dizziness,weakness,numbness,back/joints pain. Pt states he was seen by dentist couple of months ago.He has poor dentition  w/multiple tooth extraction. Denies sore throat/neck/ears pain.     PMH: per chart and pt  ?htn-pt denies   Hyperlipidemia  Frequent fall  Dental infection w/multiple teeth extraction  BPH  Hyperlipidemia  MR  GERD  ?Asthma    FH:  No heart disease,htn,dm,stroke per pt    Social history: From [facility] [employer], denies smoking/illicit drug uses, used to drink occasionally in his young age  but not drinking anymore per pt.    < from: CT Maxillofacial w/ IV Cont (06.22.22 @ 10:27) >    1.  No CT evidence for acute intracranial hemorrhage, territorial   infarction or mass effect. If patient has persistent symptoms and/or new   focal neurologic deficits, consider further evaluation with MRI.    2.  Extensive cutaneous/subcutaneousfat stranding and enhancement within   the soft tissues of the bilateral anterolateral and midline neck, with   involvement of the buccal space and submandibular regions. Findings are   nonspecific but suggestive of infection (cellulitis and/or phlegmon). No   organized/drainable fluid collection is identified. Serial follow-up can   be considered as clinically indicated.    3.  Odontogenic disease, with no periapical lucencies, dental caries and   absence of numerous maxillary/mandibular teeth.    < end of copied text >  < from: CT Cervical Spine No Cont (06.22.22 @ 10:26) >    IMPRESSION:    No CT evidence for acute traumatic fracture or subluxation within the   cervical spine (within limitations of streak/motion artifact). Mild   uncovering of left anterior atlantooccipital and atlantoaxial   articulations, likely positional in nature. However, correlation with   point tenderness. If symptoms persist or if there is high clinical   suspicion for traumatic injury, consider further evaluation with MRI   cervical spine.    < end of copied text >  < from: Xray Pelvis AP only (06.22.22 @ 10:25) >  IMPRESSION: No acute finding.    < end of copied text >  < from: Xray Chest 1 View-PORTABLE IMMEDIATE (03.21.19 @ 09:22) >    IMPRESSION: Cardiomegaly.    < end of copied text >

## 2022-06-22 NOTE — ED CLERICAL - DIVISION
.. 
Emergency Department Nursing Plan of Care The Nursing Plan of Care is developed from the Nursing assessment and Emergency Department Attending provider initial evaluation. The plan of care may be reviewed in the ED Provider note. The Plan of Care was developed with the following considerations:  
Patient / Family readiness to learn indicated by:verbalized understanding and appropriate questions asked Persons(s) to be included in education: patient Barriers to Learning/Limitations:No 
 
Signed Kiran Holden RN   
11/15/2018   12:03 PM 
 [facility]

## 2022-06-22 NOTE — ED ADULT NURSE NOTE - OBJECTIVE STATEMENT
pt care assumed at 0830, no apparent distress noted at this time. pt received Alert and Oriented to person, place, situation and time resting in bed comfortably in yellow gown with no belongings at the bedside. pt s/p fall at 7-11. pt states he had the shakes for a few days and that caused him to lose his balance. pt also c/o frontal tooth pain for a few days. swelling noted to right lower jaw and swelling noted to his lower. pt denies LOC or blood thinner use. pt has abrasion noted to his right knee. S1 and S2 sounds heard, lung sounds are clear b/l.

## 2022-06-22 NOTE — CONSULT NOTE ADULT - ASSESSMENT
76 y/o M with h/o MR from group home sent fro recurrent fall today and has rt facial swelling. Pt states that he had diarrhea for 1 month and feels weak and could not go to the program.  Pt is started Amoxicillin yesterday [facility]. CT with extensive cellulitis/phlegmon of b/l a/l midline neck + buccal space and submandibular regions.    c/w unasyn  f/u BCX  ENT/surgery eval    d/w attending    will f/u

## 2022-06-22 NOTE — H&P ADULT - ASSESSMENT
78 y/o M with h/o MR,hlp,bph,frequent fall,dental infection from group home sent for fall and rt facial swelling. cth neg CVA,  Extensive cutaneous/subcutaneousfat stranding and enhancement within soft tissues of the bilateral anterolateral and midline neck, with involvement of the buccal space and submandibular regions. Findings are   nonspecific but suggestive of infection (cellulitis and/or phlegmon) Odontogenic disease, with no periapical lucencies, dental caries and  absence of numerous maxillary/mandibular teeth.    Rt facial cellulitis include neck w/leukocytosis likley from dental infection  -IV unasyn, got dose of clindamycin ED  -C/S ent/id by ED  -Afebrile, trend wbc, nl LA  -Pt denies any pain  - f/u cbc,bmp  -started amoxicillin yesterday    Fall , hx frequent fall  -ct head/c spine neg  - no joint swelling except rt knee abration  -check orth bp  -neuro check  -tele monitor for arrythmia  -PT EVAL  -hold terazosin for now    HLP  -Statin    BPH  -Flomax  -hold terazosin ,check orth bp    MR  -On zyprexa    ?HTN  -Pt refused  -not on any meds  -will monitor      Care of plan dw pt  dw ed physician  Pt did not come w/any documents from Group home,yanet pt/ ed able to give any information    Pharmacy [facility]-meds reviewed

## 2022-06-22 NOTE — ED ADULT TRIAGE NOTE - CHIEF COMPLAINT QUOTE
pt coming from group home for eval of frequent falls today (unwitnessed). pt reports he has been dealing with a "tooth infection". edema not to right side of face. a and o x3. breathing even and unlabored. - blood thinners.

## 2022-06-22 NOTE — ED ADULT NURSE REASSESSMENT NOTE - NS ED NURSE REASSESS COMMENT FT1
Pt is resting in bed comfortably at this time, no apparent distress noted at this time. pt safety maintained. Pt states he wants to go home and does not want to stay. RN advised pt of condition and suggested pt to stay for IV abx. pt cooperative at this time.

## 2022-06-22 NOTE — ED PROVIDER NOTE - PROGRESS NOTE DETAILS
pt noted to have extensive facial cellulitis b/l, no odontogenic infection, pt admitted for iv antibitoics, called ENT consult pt noted to have extensive facial cellulitis b/l, no odontogenic infection, pt admitted for iv antibiotics, called ENT consult spoke to ENT and stated that acute care surgery to be consulted and if further intervention is needed, ENT will evaluate the patient surgery consult called

## 2022-06-22 NOTE — ED PROVIDER NOTE - CLINICAL SUMMARY MEDICAL DECISION MAKING FREE TEXT BOX
pt has rt facial swelling, will r/o abscess,  will do ct head, neck ct max facial with iv contrast, check labs

## 2022-06-22 NOTE — ED ADULT NURSE NOTE - NSIMPLEMENTINTERV_GEN_ALL_ED
Implemented All Fall Risk Interventions:  [place] to call system. Call bell, personal items and telephone within reach. Instruct patient to call for assistance. Room bathroom lighting operational. Non-slip footwear when patient is off stretcher. Physically safe environment: no spills, clutter or unnecessary equipment. Stretcher in lowest position, wheels locked, appropriate side rails in place. Provide visual cue, wrist band, yellow gown, etc. Monitor gait and stability. Monitor for mental status changes and reorient to person, place, and time. Review medications for side effects contributing to fall risk. Reinforce activity limits and safety measures with patient and family.

## 2022-06-23 LAB
A1C WITH ESTIMATED AVERAGE GLUCOSE RESULT: 5.4 % — SIGNIFICANT CHANGE UP (ref 4–5.6)
ALBUMIN SERPL ELPH-MCNC: 4 G/DL — SIGNIFICANT CHANGE UP (ref 3.3–5.2)
ALP SERPL-CCNC: 58 U/L — SIGNIFICANT CHANGE UP (ref 40–120)
ALT FLD-CCNC: 10 U/L — SIGNIFICANT CHANGE UP
ANION GAP SERPL CALC-SCNC: 11 MMOL/L — SIGNIFICANT CHANGE UP (ref 5–17)
AST SERPL-CCNC: 17 U/L — SIGNIFICANT CHANGE UP
BASOPHILS # BLD AUTO: 0.04 K/UL — SIGNIFICANT CHANGE UP (ref 0–0.2)
BASOPHILS NFR BLD AUTO: 0.3 % — SIGNIFICANT CHANGE UP (ref 0–2)
BILIRUB SERPL-MCNC: 0.7 MG/DL — SIGNIFICANT CHANGE UP (ref 0.4–2)
BUN SERPL-MCNC: 15.4 MG/DL — SIGNIFICANT CHANGE UP (ref 8–20)
CALCIUM SERPL-MCNC: 9.4 MG/DL — SIGNIFICANT CHANGE UP (ref 8.6–10.2)
CHLORIDE SERPL-SCNC: 103 MMOL/L — SIGNIFICANT CHANGE UP (ref 98–107)
CHOLEST SERPL-MCNC: 137 MG/DL — SIGNIFICANT CHANGE UP
CO2 SERPL-SCNC: 26 MMOL/L — SIGNIFICANT CHANGE UP (ref 22–29)
CREAT SERPL-MCNC: 0.82 MG/DL — SIGNIFICANT CHANGE UP (ref 0.5–1.3)
EGFR: 92 ML/MIN/1.73M2 — SIGNIFICANT CHANGE UP
EOSINOPHIL # BLD AUTO: 0.07 K/UL — SIGNIFICANT CHANGE UP (ref 0–0.5)
EOSINOPHIL NFR BLD AUTO: 0.6 % — SIGNIFICANT CHANGE UP (ref 0–6)
ESTIMATED AVERAGE GLUCOSE: 108 MG/DL — SIGNIFICANT CHANGE UP (ref 68–114)
GLUCOSE SERPL-MCNC: 90 MG/DL — SIGNIFICANT CHANGE UP (ref 70–99)
HCT VFR BLD CALC: 39.9 % — SIGNIFICANT CHANGE UP (ref 39–50)
HCV AB S/CO SERPL IA: 0.11 S/CO — SIGNIFICANT CHANGE UP (ref 0–0.99)
HCV AB SERPL-IMP: SIGNIFICANT CHANGE UP
HDLC SERPL-MCNC: 55 MG/DL — SIGNIFICANT CHANGE UP
HGB BLD-MCNC: 13.5 G/DL — SIGNIFICANT CHANGE UP (ref 13–17)
IMM GRANULOCYTES NFR BLD AUTO: 0.5 % — SIGNIFICANT CHANGE UP (ref 0–1.5)
LIPID PNL WITH DIRECT LDL SERPL: 67 MG/DL — SIGNIFICANT CHANGE UP
LYMPHOCYTES # BLD AUTO: 17.3 % — SIGNIFICANT CHANGE UP (ref 13–44)
LYMPHOCYTES # BLD AUTO: 2.1 K/UL — SIGNIFICANT CHANGE UP (ref 1–3.3)
MCHC RBC-ENTMCNC: 31.8 PG — SIGNIFICANT CHANGE UP (ref 27–34)
MCHC RBC-ENTMCNC: 33.8 GM/DL — SIGNIFICANT CHANGE UP (ref 32–36)
MCV RBC AUTO: 94.1 FL — SIGNIFICANT CHANGE UP (ref 80–100)
MONOCYTES # BLD AUTO: 1.07 K/UL — HIGH (ref 0–0.9)
MONOCYTES NFR BLD AUTO: 8.8 % — SIGNIFICANT CHANGE UP (ref 2–14)
NEUTROPHILS # BLD AUTO: 8.8 K/UL — HIGH (ref 1.8–7.4)
NEUTROPHILS NFR BLD AUTO: 72.5 % — SIGNIFICANT CHANGE UP (ref 43–77)
NON HDL CHOLESTEROL: 82 MG/DL — SIGNIFICANT CHANGE UP
PLATELET # BLD AUTO: 195 K/UL — SIGNIFICANT CHANGE UP (ref 150–400)
POTASSIUM SERPL-MCNC: 4 MMOL/L — SIGNIFICANT CHANGE UP (ref 3.5–5.3)
POTASSIUM SERPL-SCNC: 4 MMOL/L — SIGNIFICANT CHANGE UP (ref 3.5–5.3)
PROT SERPL-MCNC: 6.7 G/DL — SIGNIFICANT CHANGE UP (ref 6.6–8.7)
RBC # BLD: 4.24 M/UL — SIGNIFICANT CHANGE UP (ref 4.2–5.8)
RBC # FLD: 12.6 % — SIGNIFICANT CHANGE UP (ref 10.3–14.5)
SODIUM SERPL-SCNC: 140 MMOL/L — SIGNIFICANT CHANGE UP (ref 135–145)
TRIGL SERPL-MCNC: 73 MG/DL — SIGNIFICANT CHANGE UP
WBC # BLD: 12.14 K/UL — HIGH (ref 3.8–10.5)
WBC # FLD AUTO: 12.14 K/UL — HIGH (ref 3.8–10.5)

## 2022-06-23 PROCEDURE — 99232 SBSQ HOSP IP/OBS MODERATE 35: CPT

## 2022-06-23 PROCEDURE — 99233 SBSQ HOSP IP/OBS HIGH 50: CPT

## 2022-06-23 RX ORDER — OLANZAPINE 15 MG/1
5 TABLET, FILM COATED ORAL ONCE
Refills: 0 | Status: COMPLETED | OUTPATIENT
Start: 2022-06-23 | End: 2022-06-23

## 2022-06-23 RX ORDER — AMPICILLIN SODIUM AND SULBACTAM SODIUM 250; 125 MG/ML; MG/ML
3 INJECTION, POWDER, FOR SUSPENSION INTRAMUSCULAR; INTRAVENOUS ONCE
Refills: 0 | Status: COMPLETED | OUTPATIENT
Start: 2022-06-23 | End: 2022-06-23

## 2022-06-23 RX ORDER — AMPICILLIN SODIUM AND SULBACTAM SODIUM 250; 125 MG/ML; MG/ML
INJECTION, POWDER, FOR SUSPENSION INTRAMUSCULAR; INTRAVENOUS
Refills: 0 | Status: DISCONTINUED | OUTPATIENT
Start: 2022-06-23 | End: 2022-06-24

## 2022-06-23 RX ORDER — AMPICILLIN SODIUM AND SULBACTAM SODIUM 250; 125 MG/ML; MG/ML
3 INJECTION, POWDER, FOR SUSPENSION INTRAMUSCULAR; INTRAVENOUS EVERY 6 HOURS
Refills: 0 | Status: DISCONTINUED | OUTPATIENT
Start: 2022-06-23 | End: 2022-06-24

## 2022-06-23 RX ADMIN — AMPICILLIN SODIUM AND SULBACTAM SODIUM 100 GRAM(S): 250; 125 INJECTION, POWDER, FOR SUSPENSION INTRAMUSCULAR; INTRAVENOUS at 00:26

## 2022-06-23 RX ADMIN — ENOXAPARIN SODIUM 40 MILLIGRAM(S): 100 INJECTION SUBCUTANEOUS at 05:43

## 2022-06-23 RX ADMIN — AMPICILLIN SODIUM AND SULBACTAM SODIUM 200 GRAM(S): 250; 125 INJECTION, POWDER, FOR SUSPENSION INTRAMUSCULAR; INTRAVENOUS at 18:35

## 2022-06-23 RX ADMIN — SODIUM CHLORIDE 65 MILLILITER(S): 9 INJECTION INTRAMUSCULAR; INTRAVENOUS; SUBCUTANEOUS at 12:55

## 2022-06-23 RX ADMIN — OLANZAPINE 20 MILLIGRAM(S): 15 TABLET, FILM COATED ORAL at 12:43

## 2022-06-23 RX ADMIN — SODIUM CHLORIDE 65 MILLILITER(S): 9 INJECTION INTRAMUSCULAR; INTRAVENOUS; SUBCUTANEOUS at 21:24

## 2022-06-23 RX ADMIN — AMPICILLIN SODIUM AND SULBACTAM SODIUM 100 GRAM(S): 250; 125 INJECTION, POWDER, FOR SUSPENSION INTRAMUSCULAR; INTRAVENOUS at 05:42

## 2022-06-23 RX ADMIN — FAMOTIDINE 20 MILLIGRAM(S): 10 INJECTION INTRAVENOUS at 18:33

## 2022-06-23 RX ADMIN — OLANZAPINE 5 MILLIGRAM(S): 15 TABLET, FILM COATED ORAL at 21:23

## 2022-06-23 RX ADMIN — FAMOTIDINE 20 MILLIGRAM(S): 10 INJECTION INTRAVENOUS at 05:43

## 2022-06-23 RX ADMIN — SIMVASTATIN 20 MILLIGRAM(S): 20 TABLET, FILM COATED ORAL at 21:23

## 2022-06-23 RX ADMIN — TAMSULOSIN HYDROCHLORIDE 0.4 MILLIGRAM(S): 0.4 CAPSULE ORAL at 21:23

## 2022-06-23 RX ADMIN — AMPICILLIN SODIUM AND SULBACTAM SODIUM 200 GRAM(S): 250; 125 INJECTION, POWDER, FOR SUSPENSION INTRAMUSCULAR; INTRAVENOUS at 12:43

## 2022-06-23 NOTE — PATIENT PROFILE ADULT - FALL HARM RISK - HARM RISK INTERVENTIONS
Assistance OOB with selected safe patient handling equipment/Communicate Risk of Fall with Harm to all staff/Monitor for mental status changes/Move patient closer to nurses' station/Reinforce activity limits and safety measures with patient and family/Reorient to person, place and time as needed/Tailored Fall Risk Interventions/Toileting schedule using arm’s reach rule for commode and bathroom/Use of alarms - bed, chair and/or voice tab/Visual Cue: Yellow wristband and red socks/Bed in lowest position, wheels locked, appropriate side rails in place/Call bell, personal items and telephone in reach/Instruct patient to call for assistance before getting out of bed or chair/Non-slip footwear when patient is out of bed/[place] to call system/Physically safe environment - no spills, clutter or unnecessary equipment/Purposeful Proactive Rounding/Room/bathroom lighting operational, light cord in reach

## 2022-06-23 NOTE — PATIENT PROFILE ADULT - HOME ACCESSIBILITY CONCERNS
none
pt symptomatically improved. ct with hydrouteronephrosis with no renal calculi. pt admits symptomatic improvement. stable for d/c. f/u with urology

## 2022-06-23 NOTE — CONSULT NOTE ADULT - SUBJECTIVE AND OBJECTIVE BOX
77 man mrcp with right facial swelling and neck swelling. being treated with IV abx and improving.     CT review showed right neck and cheek swelling with no abscsess    WBC 14 no fevers    exam today is described by nurse as much imprvoed    swelling is minimal    slight firmness to the tissue by the right aspect of the chin    extremely poor dentition    this is most likely a dental infection at origin    there is no indication for surgical intervention    contIV abx but i would recc increase to 3g unasyn IV q6 as opposed to 1.5 gram    i wouild reccomend dental evalluation as this infection is most likely dental in origin    please re consiult as needed
[facility]  INFECTIOUS DISEASES AND INTERNAL MEDICINE at [place]  =======================================================  Noe Kang MD  Diplomates American Board of Internal Medicine and Infectious Diseases  Tel: [phone]      Fax: [phone]  =======================================================      MRN-[id number]  VERONICA PORITLLO    CC: Patient is a 77y old  Male who presents with a chief complaint of fall, facial cellulitis (22 Jun 2022 15:00)      78 y/o M with h/o MR from group home sent fro recurrent fall today and has rt facial swelling. Pt states that he had diarrhea for 1 month and feels weak and could not go to the program. pt states he had the shakes for a few days and that caused him to lose his balance. He has loose stool for while but not present now. pt also c/o frontal tooth pain for a few days and welling noted to right lower jaw and swelling noted to his lower rt jaw. pt denies LOC or blood thinner use. pt has abrasion noted to his right knee. Pt states he fell on his knees. Pt is started Amoxicillin yesterday per [facility] pharmacy. Pt denies fever,chill,cp,sob,headache ,dizziness,weakness,numbness,back/joints pain. Pt states he was seen by dentist couple of months ago.He has poor dentition  w/multiple tooth extraction. Denies sore throat/neck/ears pain.         Past Medical & Surgical Hx:  PAST MEDICAL & SURGICAL HISTORY:  No pertinent past medical history      Hyperlipidemia              Social Hx:    FAMILY HISTORY:      Allergies    No Known Allergies    Intolerances             REVIEW OF SYSTEMS:  CONSTITUTIONAL:  No Fever or chills  HEENT:  No diplopia or blurred vision.  No earache, sore throat or runny nose.  CARDIOVASCULAR:  No pressure, squeezing, strangling, tightness, heaviness or aching about the chest, neck, axilla or epigastrium.  RESPIRATORY:  No cough, shortness of breath  GASTROINTESTINAL:  No nausea, vomiting or diarrhea.  GENITOURINARY:  No dysuria, frequency or urgency. No Blood in urine  MUSCULOSKELETAL:  no joint aches, no muscle pain  SKIN:  No change in skin, hair or nails.  NEUROLOGIC:  No Headaches, seizures or weakness.  PSYCHIATRIC:  No disorder of thought or mood.  ENDOCRINE:  No heat or cold intolerance  HEMATOLOGICAL:  No easy bruising or bleeding.       Physical Exam:    GEN: NAD, pleasant  HEENT: normocephalic and atraumatic. EOMI. PERRL.  Anicteric  NECK: Supple.  b/l swelling >right neck and face  LUNGS: Clear to auscultation.  HEART: Regular rate and rhythm without murmur.  ABDOMEN: Soft, nontender, and nondistended.  Positive bowel sounds.    : No CVA tenderness  EXTREMITIES: Without any edema.  MSK: No joint swelling  NEUROLOGIC: Cranial nerves II through XII are grossly intact. No Focal Deficits  PSYCHIATRIC: Appropriate affect .  SKIN: No Rash    Height (cm): 175.3 (06-22 @ 07:59)  Weight (kg): 90.7 (06-22 @ 07:59)  BMI (kg/m2): 29.5 (06-22 @ 07:59)  BSA (m2): 2.07 (06-22 @ 07:59)    Vitals:    T(F): 98.5 (22 Jun 2022 19:32), Max: 98.5 (22 Jun 2022 16:16)  HR: 81 (22 Jun 2022 19:32)  BP: 157/92 (22 Jun 2022 19:32)  RR: 20 (22 Jun 2022 19:32)  SpO2: 95% (22 Jun 2022 19:32) (95% - 97%)  temp max in last 48H T(F): , Max: 98.5 (06-22-22 @ 16:16)    Current Antibiotics:  ampicillin/sulbactam  IVPB      ampicillin/sulbactam  IVPB 1.5 Gram(s) IV Intermittent every 6 hours    Other medications:  enoxaparin Injectable 40 milliGRAM(s) SubCutaneous every 24 hours  famotidine    Tablet 20 milliGRAM(s) Oral two times a day  montelukast 10 milliGRAM(s) Oral daily  OLANZapine 20 milliGRAM(s) Oral daily  simvastatin 20 milliGRAM(s) Oral at bedtime  sodium chloride 0.9%. 1000 milliLiter(s) IV Continuous <Continuous>  tamsulosin 0.4 milliGRAM(s) Oral at bedtime                            14.5   14.53 )-----------( 186      ( 22 Jun 2022 09:12 )             41.6     06-22    134<L>  |  100  |  19.3  ----------------------------<  107<H>  4.1   |  25.0  |  1.04    Ca    9.4      22 Jun 2022 09:12    TPro  6.7  /  Alb  3.9  /  TBili  0.8  /  DBili  x   /  AST  16  /  ALT  10  /  AlkPhos  63  06-22    RECENT CULTURES:  06-22 @ 09:13          [facility]      WBC Count: 14.53 K/uL (06-22-22 @ 09:12)    Creatinine, Serum: 1.04 mg/dL (06-22-22 @ 09:12)    C-Reactive Protein, Serum: 140 mg/L (06-22-22 @ 09:12)      Sedimentation Rate, Erythrocyte: 30 mm/hr (06-22-22 @ 09:12)       SARS-CoV-2: NotDete (06-22-22 @ 09:13)      < from: CT Maxillofacial w/ IV Cont (06.22.22 @ 10:27) >  IMPRESSION:    1.  No CT evidence for acute intracranial hemorrhage, territorial   infarction or mass effect. If patient has persistent symptoms and/or new   focal neurologic deficits, consider further evaluation with MRI.    2.  Extensive cutaneous/subcutaneousfat stranding and enhancement within   the soft tissues of the bilateral anterolateral and midline neck, with   involvement of the buccal space and submandibular regions. Findings are   nonspecific but suggestive of infection (cellulitis and/or phlegmon). No   organized/drainable fluid collection is identified. Serial follow-up can   be considered as clinically indicated.    3.  Odontogenic disease, with no periapical lucencies, dental caries and   absence of numerous maxillary/mandibular teeth.      < end of copied text >

## 2022-06-23 NOTE — PHYSICAL THERAPY INITIAL EVALUATION ADULT - ADDITIONAL COMMENTS
lives in a group home, no stairs to enter home, flight stairs to bedroom c rail, has a roommate(cannot assist), owns no DME

## 2022-06-24 ENCOUNTER — TRANSCRIPTION ENCOUNTER (OUTPATIENT)
Age: 77
End: 2022-06-24

## 2022-06-24 VITALS
DIASTOLIC BLOOD PRESSURE: 78 MMHG | SYSTOLIC BLOOD PRESSURE: 146 MMHG | OXYGEN SATURATION: 97 % | HEART RATE: 60 BPM | RESPIRATION RATE: 16 BRPM | TEMPERATURE: 98 F

## 2022-06-24 LAB
ALBUMIN SERPL ELPH-MCNC: 3.4 G/DL — SIGNIFICANT CHANGE UP (ref 3.3–5.2)
ALP SERPL-CCNC: 58 U/L — SIGNIFICANT CHANGE UP (ref 40–120)
ALT FLD-CCNC: 16 U/L — SIGNIFICANT CHANGE UP
ANION GAP SERPL CALC-SCNC: 9 MMOL/L — SIGNIFICANT CHANGE UP (ref 5–17)
AST SERPL-CCNC: 24 U/L — SIGNIFICANT CHANGE UP
BILIRUB SERPL-MCNC: 0.4 MG/DL — SIGNIFICANT CHANGE UP (ref 0.4–2)
BUN SERPL-MCNC: 13.3 MG/DL — SIGNIFICANT CHANGE UP (ref 8–20)
CALCIUM SERPL-MCNC: 9.1 MG/DL — SIGNIFICANT CHANGE UP (ref 8.6–10.2)
CHLORIDE SERPL-SCNC: 105 MMOL/L — SIGNIFICANT CHANGE UP (ref 98–107)
CO2 SERPL-SCNC: 28 MMOL/L — SIGNIFICANT CHANGE UP (ref 22–29)
CREAT SERPL-MCNC: 0.74 MG/DL — SIGNIFICANT CHANGE UP (ref 0.5–1.3)
EGFR: 94 ML/MIN/1.73M2 — SIGNIFICANT CHANGE UP
GLUCOSE SERPL-MCNC: 87 MG/DL — SIGNIFICANT CHANGE UP (ref 70–99)
HCT VFR BLD CALC: 37.6 % — LOW (ref 39–50)
HGB BLD-MCNC: 12.6 G/DL — LOW (ref 13–17)
MAGNESIUM SERPL-MCNC: 1.9 MG/DL — SIGNIFICANT CHANGE UP (ref 1.6–2.6)
MCHC RBC-ENTMCNC: 32 PG — SIGNIFICANT CHANGE UP (ref 27–34)
MCHC RBC-ENTMCNC: 33.5 GM/DL — SIGNIFICANT CHANGE UP (ref 32–36)
MCV RBC AUTO: 95.4 FL — SIGNIFICANT CHANGE UP (ref 80–100)
PHOSPHATE SERPL-MCNC: 3.4 MG/DL — SIGNIFICANT CHANGE UP (ref 2.4–4.7)
PLATELET # BLD AUTO: 233 K/UL — SIGNIFICANT CHANGE UP (ref 150–400)
POTASSIUM SERPL-MCNC: 4.1 MMOL/L — SIGNIFICANT CHANGE UP (ref 3.5–5.3)
POTASSIUM SERPL-SCNC: 4.1 MMOL/L — SIGNIFICANT CHANGE UP (ref 3.5–5.3)
PROT SERPL-MCNC: 6 G/DL — LOW (ref 6.6–8.7)
RBC # BLD: 3.94 M/UL — LOW (ref 4.2–5.8)
RBC # FLD: 12.5 % — SIGNIFICANT CHANGE UP (ref 10.3–14.5)
SODIUM SERPL-SCNC: 141 MMOL/L — SIGNIFICANT CHANGE UP (ref 135–145)
WBC # BLD: 7.36 K/UL — SIGNIFICANT CHANGE UP (ref 3.8–10.5)
WBC # FLD AUTO: 7.36 K/UL — SIGNIFICANT CHANGE UP (ref 3.8–10.5)

## 2022-06-24 PROCEDURE — 87040 BLOOD CULTURE FOR BACTERIA: CPT

## 2022-06-24 PROCEDURE — 86140 C-REACTIVE PROTEIN: CPT

## 2022-06-24 PROCEDURE — 97163 PT EVAL HIGH COMPLEX 45 MIN: CPT

## 2022-06-24 PROCEDURE — 70450 CT HEAD/BRAIN W/O DYE: CPT | Mod: MA

## 2022-06-24 PROCEDURE — 70487 CT MAXILLOFACIAL W/DYE: CPT | Mod: MA

## 2022-06-24 PROCEDURE — 84100 ASSAY OF PHOSPHORUS: CPT

## 2022-06-24 PROCEDURE — 82550 ASSAY OF CK (CPK): CPT

## 2022-06-24 PROCEDURE — 36415 COLL VENOUS BLD VENIPUNCTURE: CPT

## 2022-06-24 PROCEDURE — 85027 COMPLETE CBC AUTOMATED: CPT

## 2022-06-24 PROCEDURE — 71045 X-RAY EXAM CHEST 1 VIEW: CPT

## 2022-06-24 PROCEDURE — 72125 CT NECK SPINE W/O DYE: CPT | Mod: MA

## 2022-06-24 PROCEDURE — 72170 X-RAY EXAM OF PELVIS: CPT

## 2022-06-24 PROCEDURE — 99232 SBSQ HOSP IP/OBS MODERATE 35: CPT

## 2022-06-24 PROCEDURE — 84484 ASSAY OF TROPONIN QUANT: CPT

## 2022-06-24 PROCEDURE — 85652 RBC SED RATE AUTOMATED: CPT

## 2022-06-24 PROCEDURE — 99239 HOSP IP/OBS DSCHRG MGMT >30: CPT

## 2022-06-24 PROCEDURE — 80053 COMPREHEN METABOLIC PANEL: CPT

## 2022-06-24 PROCEDURE — 81001 URINALYSIS AUTO W/SCOPE: CPT

## 2022-06-24 PROCEDURE — 93005 ELECTROCARDIOGRAM TRACING: CPT

## 2022-06-24 PROCEDURE — 96374 THER/PROPH/DIAG INJ IV PUSH: CPT

## 2022-06-24 PROCEDURE — 83036 HEMOGLOBIN GLYCOSYLATED A1C: CPT

## 2022-06-24 PROCEDURE — 83605 ASSAY OF LACTIC ACID: CPT

## 2022-06-24 PROCEDURE — 85025 COMPLETE CBC W/AUTO DIFF WBC: CPT

## 2022-06-24 PROCEDURE — 99285 EMERGENCY DEPT VISIT HI MDM: CPT

## 2022-06-24 PROCEDURE — 86803 HEPATITIS C AB TEST: CPT

## 2022-06-24 PROCEDURE — 83735 ASSAY OF MAGNESIUM: CPT

## 2022-06-24 PROCEDURE — 0225U NFCT DS DNA&RNA 21 SARSCOV2: CPT

## 2022-06-24 PROCEDURE — 80061 LIPID PANEL: CPT

## 2022-06-24 RX ORDER — AMOXICILLIN 250 MG/5ML
1 SUSPENSION, RECONSTITUTED, ORAL (ML) ORAL
Qty: 0 | Refills: 0 | DISCHARGE

## 2022-06-24 RX ADMIN — FAMOTIDINE 20 MILLIGRAM(S): 10 INJECTION INTRAVENOUS at 18:17

## 2022-06-24 RX ADMIN — SODIUM CHLORIDE 65 MILLILITER(S): 9 INJECTION INTRAMUSCULAR; INTRAVENOUS; SUBCUTANEOUS at 11:32

## 2022-06-24 RX ADMIN — OLANZAPINE 20 MILLIGRAM(S): 15 TABLET, FILM COATED ORAL at 11:37

## 2022-06-24 RX ADMIN — MONTELUKAST 10 MILLIGRAM(S): 4 TABLET, CHEWABLE ORAL at 11:37

## 2022-06-24 RX ADMIN — FAMOTIDINE 20 MILLIGRAM(S): 10 INJECTION INTRAVENOUS at 05:45

## 2022-06-24 RX ADMIN — AMPICILLIN SODIUM AND SULBACTAM SODIUM 200 GRAM(S): 250; 125 INJECTION, POWDER, FOR SUSPENSION INTRAMUSCULAR; INTRAVENOUS at 11:33

## 2022-06-24 RX ADMIN — AMPICILLIN SODIUM AND SULBACTAM SODIUM 200 GRAM(S): 250; 125 INJECTION, POWDER, FOR SUSPENSION INTRAMUSCULAR; INTRAVENOUS at 05:45

## 2022-06-24 RX ADMIN — AMPICILLIN SODIUM AND SULBACTAM SODIUM 200 GRAM(S): 250; 125 INJECTION, POWDER, FOR SUSPENSION INTRAMUSCULAR; INTRAVENOUS at 00:31

## 2022-06-24 RX ADMIN — ENOXAPARIN SODIUM 40 MILLIGRAM(S): 100 INJECTION SUBCUTANEOUS at 05:45

## 2022-06-24 RX ADMIN — AMPICILLIN SODIUM AND SULBACTAM SODIUM 200 GRAM(S): 250; 125 INJECTION, POWDER, FOR SUSPENSION INTRAMUSCULAR; INTRAVENOUS at 18:16

## 2022-06-24 NOTE — DISCHARGE NOTE PROVIDER - HOSPITAL COURSE
78 y/o M with h/o MR from group home sent fro recurrent fall today and has rt facial swelling. Pt states that he had diarrhea for 1 month and feels weak and could not go to the program. pt states he had the shakes for a few days and that caused him to lose his balance. He has loose stool for while but not present now. pt also c/o frontal tooth pain for a few days and welling noted to right lower jaw and swelling noted to his lower rt jaw. pt denies LOC or blood thinner use. pt has abrasion noted to his right knee. Pt states he fell on his knees. Pt is started Amoxicillin yesterday per [facility] pharmacy. Pt denies fever,chill,cp,sob,headache ,dizziness,weakness,numbness,back/joints pain. Pt states he was seen by dentist couple of months ago. He has poor dentition  w/multiple tooth extraction. Denies sore throat/neck/ears pain.    On presentation patient had CT which showed Extensive cutaneous/subcutaneous fat stranding and enhancement within the soft tissues of the bilateral anterolateral and midline neck, with involvement of the buccal space and submandibular regions. Patient seen by ENT who had no recommended interventions. Seen by ID who started unasyn and obtained blood cultures. blood cultures negative. patient improved on unasyn. Plan is for continued augmentin for 12 more days and outpatient follow up with orthodontist.

## 2022-06-24 NOTE — PROGRESS NOTE ADULT - SUBJECTIVE AND OBJECTIVE BOX
CT reviewed  No drainable collection  agree with IV antibiotics  please consult acute care surgery for evaluation and following  please re contact if there is any need for ENT assistance.
                                           [facility]                                                INFECTIOUS DISEASES  =======================================================                               Jewel Tovar MD#  Albert Matthew MD*                                     Mendoza Aceves MD*    Peg Kang MD*            Diplomates American Board of Internal Medicine & Infectious Diseases                  # [place] Office - Appt - Tel  [phone] Fax [phone]                * [place] Office - Appt - Tel [phone] Fax [phone]                                  Hospital Consult line:  [phone]  =======================================================      [id number]  MARTIN BANDAR   follow up for: facial cellulitis  afebrile  wbc improved  cellulitis much improved pt feels good  patient seen and examined.       I have personally reviewed the labs and data; pertinent labs and data are listed in this note; please see below.   ===================================================  REVIEW OF SYSTEMS:  CONSTITUTIONAL:  No Fever or chills  HEENT:  No diplopia or blurred vision.  No earache, sore throat or runny nose.  CARDIOVASCULAR:  No pressure, squeezing, strangling, tightness, heaviness or aching about the chest, neck, axilla or epigastrium.  RESPIRATORY:  No cough, shortness of breath  GASTROINTESTINAL:  No nausea, vomiting or diarrhea.  GENITOURINARY:  No dysuria, frequency or urgency. No Blood in urine  MUSCULOSKELETAL:  no joint aches, no muscle pain  SKIN:  No change in skin, hair or nails.  NEUROLOGIC:  No Headaches, seizures or weakness.  PSYCHIATRIC:  No disorder of thought or mood.  ENDOCRINE:  No heat or cold intolerance  HEMATOLOGICAL:  No easy bruising or bleeding.    =======================================================  Allergies    No Known Allergies    Intolerances    Antibiotics:  ampicillin/sulbactam  IVPB      ampicillin/sulbactam  IVPB 3 Gram(s) IV Intermittent every 6 hours    Other medications:  enoxaparin Injectable 40 milliGRAM(s) SubCutaneous every 24 hours  famotidine    Tablet 20 milliGRAM(s) Oral two times a day  montelukast 10 milliGRAM(s) Oral daily  OLANZapine 20 milliGRAM(s) Oral daily  simvastatin 20 milliGRAM(s) Oral at bedtime  sodium chloride 0.9%. 1000 milliLiter(s) IV Continuous <Continuous>  tamsulosin 0.4 milliGRAM(s) Oral at bedtime    ======================================================  Physical Exam:  ============  T(F): 98.2 (24 Jun 2022 11:20), Max: 98.4 (24 Jun 2022 08:31)  HR: 61 (24 Jun 2022 11:20)  BP: 160/72 (24 Jun 2022 11:20)  RR: 18 (24 Jun 2022 11:20)  SpO2: 98% (24 Jun 2022 11:20) (96% - 98%)  temp max in last 48H T(F): , Max: 98.7 (06-23-22 @ 07:29)    General:  No acute distress.  Eye: Pupils are equal, round and reactive to light, Extraocular movements are intact, Normal conjunctiva.  HENT: Normocephalic, Oral mucosa is moist, No pharyngeal erythema, No sinus tenderness.  Neck: Supple, No lymphadenopathy. mild rt neck swelling, poor dentition  Respiratory: Lungs are clear to auscultation, Respirations are non-labored.  Cardiovascular: Normal rate, Regular rhythm,    Gastrointestinal: Soft, Non-tender, Non-distended, Normal bowel sounds.  Genitourinary: No costovertebral angle tenderness.  Lymphatics: No lymphadenopathy neck,   Musculoskeletal: Normal range of motion, Normal strength.  Integumentary: No rash.  Neurologic: Alert, Oriented, No focal deficits, Cranial Nerves II-XII are grossly intact.  Psychiatric: Appropriate mood & affect.  =======================================================  Labs:                        12.6   7.36  )-----------( 233      ( 24 Jun 2022 06:27 )             37.6     06-24    141  |  105  |  13.3  ----------------------------<  87  4.1   |  28.0  |  0.74    Ca    9.1      24 Jun 2022 06:27  Phos  3.4     06-24  Mg     1.9     06-24    TPro  6.0<L>  /  Alb  3.4  /  TBili  0.4  /  DBili  x   /  AST  24  /  ALT  16  /  AlkPhos  58  06-24      Culture - Blood (collected 06-22-22 @ 16:29)  Source: .Blood Blood-Venous    Culture - Blood (collected 06-22-22 @ 16:29)  Source: .Blood Blood-Peripheral        
                                           [facility]                                                INFECTIOUS DISEASES  =======================================================                               Jewel Tovar MD#  Albert Matthew MD*                                     Mendoza Aceves MD*    Peg Kang MD*            Diplomates American Board of Internal Medicine & Infectious Diseases                  # [place] Office - Appt - Tel  [phone] Fax [phone]                * [place] Office - Appt - Tel [phone] Fax [phone]                                  Hospital Consult line:  [phone]  =======================================================      [id number]  VERONICA PORTILLO   follow up for: facial cellulitis  states swelling is better today  wbc improved  patient seen and examined.       I have personally reviewed the labs and data; pertinent labs and data are listed in this note; please see below.   ===================================================  REVIEW OF SYSTEMS:  CONSTITUTIONAL:  No Fever or chills  HEENT:  No diplopia or blurred vision.  No earache, sore throat or runny nose.  CARDIOVASCULAR:  No pressure, squeezing, strangling, tightness, heaviness or aching about the chest, neck, axilla or epigastrium.  RESPIRATORY:  No cough, shortness of breath  GASTROINTESTINAL:  No nausea, vomiting or diarrhea.  GENITOURINARY:  No dysuria, frequency or urgency. No Blood in urine  MUSCULOSKELETAL:  no joint aches, no muscle pain  SKIN:  No change in skin, hair or nails.  NEUROLOGIC:  No Headaches, seizures or weakness.  PSYCHIATRIC:  No disorder of thought or mood.  ENDOCRINE:  No heat or cold intolerance  HEMATOLOGICAL:  No easy bruising or bleeding.    =======================================================  Allergies    No Known Allergies    Intolerances    Antibiotics:  ampicillin/sulbactam  IVPB      ampicillin/sulbactam  IVPB 3 Gram(s) IV Intermittent every 6 hours    Other medications:  enoxaparin Injectable 40 milliGRAM(s) SubCutaneous every 24 hours  famotidine    Tablet 20 milliGRAM(s) Oral two times a day  montelukast 10 milliGRAM(s) Oral daily  OLANZapine 20 milliGRAM(s) Oral daily  OLANZapine Injectable 5 milliGRAM(s) IntraMuscular once  simvastatin 20 milliGRAM(s) Oral at bedtime  sodium chloride 0.9%. 1000 milliLiter(s) IV Continuous <Continuous>  tamsulosin 0.4 milliGRAM(s) Oral at bedtime    ======================================================  Physical Exam:  ============  T(F): 98.7 (23 Jun 2022 11:09), Max: 98.7 (23 Jun 2022 07:29)  HR: 61 (23 Jun 2022 11:09)  BP: 111/66 (23 Jun 2022 11:09)  RR: 20 (23 Jun 2022 11:09)  SpO2: 98% (23 Jun 2022 11:09) (97% - 98%)  temp max in last 48H T(F): , Max: 98.7 (06-23-22 @ 07:29)    General:  No acute distress.  Eye: Pupils are equal, round and reactive to light, Extraocular movements are intact, Normal conjunctiva.  HENT: Normocephalic, Oral mucosa is moist, No pharyngeal erythema, No sinus tenderness. swelling over rt side of face and upper neck. dental caries  Neck: Supple, No lymphadenopathy.  Respiratory: Lungs are clear to auscultation, Respirations are non-labored.  Cardiovascular: Normal rate, Regular rhythm,    Gastrointestinal: Soft, Non-tender, Non-distended, Normal bowel sounds.  Genitourinary: No costovertebral angle tenderness.  Lymphatics: No lymphadenopathy neck,   Musculoskeletal: Normal range of motion, Normal strength.  Integumentary: No rash.  Neurologic: Alert, Oriented, No focal deficits, Cranial Nerves II-XII are grossly intact.  Psychiatric: Appropriate mood & affect.  =======================================================  Labs:                        13.5   12.14 )-----------( 195      ( 23 Jun 2022 02:50 )             39.9     06-23    140  |  103  |  15.4  ----------------------------<  90  4.0   |  26.0  |  0.82    Ca    9.4      23 Jun 2022 02:50    TPro  6.7  /  Alb  4.0  /  TBili  0.7  /  DBili  x   /  AST  17  /  ALT  10  /  AlkPhos  58  06-23      Culture - Blood (collected 06-22-22 @ 16:29)  Source: .Blood Blood-Venous    Culture - Blood (collected 06-22-22 @ 16:29)  Source: .Blood Blood-Peripheral        
T(F): 98.7 (23 Jun 2022 11:09), Max: 98.7 (23 Jun 2022 07:29)  HR: 61 (23 Jun 2022 11:09)  BP: 111/66 (23 Jun 2022 11:09)  RR: 20 (23 Jun 2022 11:09)  SpO2: 98% (23 Jun 2022 11:09) (97% - 98%)  temp max in last 48H T(F): , Max: 98.7 (06-23-22 @ 07:29)    General:  No acute distress.  HENT:  No sinus tenderness. mild  swelling over rt side of face and upper neck. dental caries  Respiratory: Lungs are clear to auscultation, Respirations are non-labored.  Cardiovascular: Normal rate, Regular rhythm,    Gastrointestinal: Soft, Non-tender, Non-distended, Normal bowel sounds.  Lymphatics: No lymphadenopathy neck,   Neurologic:  grossly intact.    Labs:                        13.5   12.14 )-----------( 195      ( 23 Jun 2022 02:50 )             39.9     06-23    140  |  103  |  15.4  ----------------------------<  90  4.0   |  26.0  |  0.82    Ca    9.4      23 Jun 2022 02:50    TPro  6.7  /  Alb  4.0  /  TBili  0.7  /  DBili  x   /  AST  17  /  ALT  10  /  AlkPhos  58  06-23      Culture - Blood (collected 06-22-22 @ 16:29)  Source: .Blood Blood-Venous    Culture - Blood (collected 06-22-22 @ 16:29)  Source: .Blood Blood-Peripheral

## 2022-06-24 NOTE — DISCHARGE NOTE PROVIDER - PROVIDER TOKENS
FREE:[LAST:[primary care physician],PHONE:[(   )    -],FAX:[(   )    -]],FREE:[LAST:[Orthodontist Service],PHONE:[phone]],FAX:[(   )    -],ADDRESS:[[address] [place] [address], [place] [address]],FOLLOWUP:[2 weeks]]

## 2022-06-24 NOTE — PROGRESS NOTE ADULT - ASSESSMENT
76 y/o M with h/o MR from group home sent fro recurrent fall today and has rt facial swelling. Pt states that he had diarrhea for 1 month and feels weak and could not go to the program.  Pt is started Amoxicillin yesterday per [facility]. CT with extensive cellulitis/phlegmon of b/l a/l midline neck + buccal space and submandibular regions.    c/w unasyn  f/u BCX  per ENT no intervention  will needdental eval        will f/u
76 y/o M with h/o MR,hlp,bph,frequent fall,dental infection from group home sent for fall and rt facial swelling. cth neg CVA,  Extensive cutaneous/subcutaneousfat stranding and enhancement within soft tissues of the bilateral anterolateral and midline neck, with involvement of the buccal space and submandibular regions. Findings are nonspecific but suggestive of infection (cellulitis and/or phlegmon) Odontogenic disease, with no periapical lucencies, dental caries and  absence of numerous maxillary/mandibular teeth. reviewed by ent     >Rt facial cellulitis include neck w/leukocytosis likley from dental infection  -Afebrile, trend wbc, nl LA  -Pt denies any pain  - f/u cbc,bmp  -c/w unasyn   - seen by id   - ent consulted , no acute interventions at this time  - will need eventual dental eval.     >Fall , hx frequent fall  -ct head/c spine neg  - no joint swelling except rt knee abration  -check orth bp  -neuro check  -tele monitor for arrythmia  -PT EVAL  -hold terazosin for now    >HLP  -Statin    >BPH  -Flomax  -hold terazosin ,check orth bp    >MR  -On zyprexa    >HTN  -not on any meds  -will monitor      > dvt ppx: sq lovenox       
76 y/o M with h/o MR from group home sent fro recurrent fall today and has rt facial swelling. Pt states that he had diarrhea for 1 month and feels weak and could not go to the program.  Pt is started Amoxicillin yesterday per [facility]. CT with extensive cellulitis/phlegmon of b/l a/l midline neck + buccal space and submandibular regions.    c/w unasyn  BCX 6/22 ngtd  per ENT no intervention  will need dental eval  cellulitis improved. WBC normal  can complete abx with oral augmentin 875/125 q12h through 7/6/22   to complete 2 weeks        d/w attending  signing off

## 2022-06-24 NOTE — DISCHARGE NOTE PROVIDER - CARE PROVIDER_API CALL
primary care physician,   Phone: (   )    -  Fax: (   )    -  Follow Up Time:     Orthodontist Service,   [address] [place] [address], [address] [place] [address]  Phone: [phone]  Fax: (   )    -  Follow Up Time: 2 weeks

## 2022-06-24 NOTE — DISCHARGE NOTE PROVIDER - NSDCCPCAREPLAN_GEN_ALL_CORE_FT
PRINCIPAL DISCHARGE DIAGNOSIS  Diagnosis: Facial cellulitis  Assessment and Plan of Treatment: continue with augmentin for 12 more days. Follow up with orthodontist in 2 weeks. Can follow at [facility] through [facility] [phone].      SECONDARY DISCHARGE DIAGNOSES  Diagnosis: Recurrent falls  Assessment and Plan of Treatment: seen by PT and cleared for return home.

## 2022-06-24 NOTE — DISCHARGE NOTE NURSING/CASE MANAGEMENT/SOCIAL WORK - NSDCPEFALRISK_GEN_ALL_CORE
For information on Fall & Injury Prevention, visit: [email or web address] OR  [email or web address] OR  https://www.cdc.gov/steadi/patient.html

## 2022-06-24 NOTE — DISCHARGE NOTE PROVIDER - NSDCMRMEDTOKEN_GEN_ALL_CORE_FT
famotidine 20 mg oral tablet: 1 tab(s) orally 2 times a day  Flomax 0.4 mg oral capsule: 1 cap(s) orally once a day  OLANZapine 20 mg oral tablet: 1 tab(s) orally once a day  simvastatin 20 mg oral tablet: 1 tab(s) orally once a day (at bedtime)  Singulair 10 mg oral tablet: 1 tab(s) orally once a day  terazosin 2 mg oral capsule: 1 cap(s) orally once a day (at bedtime)

## 2022-06-24 NOTE — DISCHARGE NOTE NURSING/CASE MANAGEMENT/SOCIAL WORK - PATIENT PORTAL LINK FT
You can access the FollowMyHealth Patient Portal offered by [facility] by registering at the following website: [email or web address]. By joining [facility]’s FollowMyHealth portal, you will also be able to view your health information using other applications (apps) compatible with our system.

## 2022-06-27 LAB
CULTURE RESULTS: SIGNIFICANT CHANGE UP
CULTURE RESULTS: SIGNIFICANT CHANGE UP
SPECIMEN SOURCE: SIGNIFICANT CHANGE UP
SPECIMEN SOURCE: SIGNIFICANT CHANGE UP

## 2022-07-27 NOTE — ED ADULT NURSE NOTE - NS_NURSE_DISC_TEACHING_YN_ED_ALL_ED
Pt wasn't able to schedule emg until February and was put on a list   Did you want him to try another facility? Yes

## 2025-02-21 NOTE — PATIENT PROFILE ADULT - NSPROPOAPRESSUREINJURY_GEN_A_NUR
Reason for call:   [x] Refill   [] Prior Auth  [x] Other: patient requesting Dr Reich fill this. Medication prescribed by another provider     Office:   [x] PCP/Provider -   [] Specialty/Provider -     Medication: lisinopril (ZESTRIL) 40 mg tab     Dose/Frequency: 40 mg daily    Quantity: 90    Pharmacy: [facility]     Does the patient have enough for 3 days?   [x] Yes   [] No - Send as HP to POD     no